# Patient Record
Sex: FEMALE | Race: WHITE | Employment: FULL TIME
[De-identification: names, ages, dates, MRNs, and addresses within clinical notes are randomized per-mention and may not be internally consistent; named-entity substitution may affect disease eponyms.]

---
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
## 2019-02-12 ENCOUNTER — APPOINTMENT (OUTPATIENT)
Dept: ULTRASOUND IMAGING | Age: 19
End: 2019-02-12
Payer: COMMERCIAL

## 2019-02-12 ENCOUNTER — HOSPITAL ENCOUNTER (EMERGENCY)
Age: 19
Discharge: HOME | End: 2019-02-12
Attending: EMERGENCY MEDICINE
Payer: COMMERCIAL

## 2019-02-12 VITALS
HEIGHT: 63 IN | TEMPERATURE: 97.9 F | OXYGEN SATURATION: 98 % | DIASTOLIC BLOOD PRESSURE: 72 MMHG | RESPIRATION RATE: 16 BRPM | WEIGHT: 152 LBS | HEART RATE: 70 BPM | BODY MASS INDEX: 26.93 KG/M2 | SYSTOLIC BLOOD PRESSURE: 111 MMHG

## 2019-02-12 DIAGNOSIS — R11.2 NON-INTRACTABLE VOMITING WITH NAUSEA, UNSPECIFIED VOMITING TYPE: ICD-10-CM

## 2019-02-12 DIAGNOSIS — N12 PYELONEPHRITIS: Primary | ICD-10-CM

## 2019-02-12 LAB
-: ABNORMAL
ALBUMIN SERPL-MCNC: 4.4 G/DL (ref 3.5–5.2)
ALBUMIN/GLOBULIN RATIO: NORMAL (ref 1–2.5)
ALP BLD-CCNC: 62 U/L (ref 35–104)
ALT SERPL-CCNC: 8 U/L (ref 5–33)
AMORPHOUS: ABNORMAL
ANION GAP SERPL CALCULATED.3IONS-SCNC: 9 MMOL/L (ref 9–17)
AST SERPL-CCNC: 14 U/L
BACTERIA: ABNORMAL
BILIRUB SERPL-MCNC: 0.34 MG/DL (ref 0.3–1.2)
BILIRUBIN URINE: NEGATIVE
BUN BLDV-MCNC: 7 MG/DL (ref 6–20)
BUN/CREAT BLD: NORMAL (ref 9–20)
CALCIUM SERPL-MCNC: 9.3 MG/DL (ref 8.6–10.4)
CASTS UA: ABNORMAL /LPF
CHLORIDE BLD-SCNC: 104 MMOL/L (ref 98–107)
CO2: 28 MMOL/L (ref 20–31)
COLOR: YELLOW
COMMENT UA: ABNORMAL
CREAT SERPL-MCNC: 0.64 MG/DL (ref 0.5–0.9)
CRYSTALS, UA: ABNORMAL /HPF
EPITHELIAL CELLS UA: ABNORMAL /HPF
GFR AFRICAN AMERICAN: NORMAL ML/MIN
GFR NON-AFRICAN AMERICAN: NORMAL ML/MIN
GFR SERPL CREATININE-BSD FRML MDRD: NORMAL ML/MIN/{1.73_M2}
GFR SERPL CREATININE-BSD FRML MDRD: NORMAL ML/MIN/{1.73_M2}
GLUCOSE BLD-MCNC: 80 MG/DL (ref 70–99)
GLUCOSE URINE: NEGATIVE
HCG(URINE) PREGNANCY TEST: NEGATIVE
HCT VFR BLD CALC: 37.7 % (ref 36–46)
HEMOGLOBIN: 12.9 G/DL (ref 12–16)
KETONES, URINE: NEGATIVE
LEUKOCYTE ESTERASE, URINE: ABNORMAL
LIPASE: 18 U/L (ref 13–60)
MCH RBC QN AUTO: 32.5 PG (ref 26–34)
MCHC RBC AUTO-ENTMCNC: 34.1 G/DL (ref 31–37)
MCV RBC AUTO: 95.3 FL (ref 80–100)
MUCUS: ABNORMAL
NITRITE, URINE: POSITIVE
NRBC AUTOMATED: ABNORMAL PER 100 WBC
OTHER OBSERVATIONS UA: ABNORMAL
PDW BLD-RTO: 13.2 % (ref 11.5–14.9)
PH UA: 7 (ref 5–8)
PLATELET # BLD: 225 K/UL (ref 150–450)
PMV BLD AUTO: 9.7 FL (ref 6–12)
POTASSIUM SERPL-SCNC: 3.9 MMOL/L (ref 3.7–5.3)
PROTEIN UA: ABNORMAL
RBC # BLD: 3.95 M/UL (ref 4–5.2)
RBC UA: ABNORMAL /HPF
RENAL EPITHELIAL, UA: ABNORMAL /HPF
SODIUM BLD-SCNC: 141 MMOL/L (ref 135–144)
SPECIFIC GRAVITY UA: 1.02 (ref 1–1.03)
TOTAL PROTEIN: 7.1 G/DL (ref 6.4–8.3)
TRICHOMONAS: ABNORMAL
TURBIDITY: ABNORMAL
URINE HGB: NEGATIVE
UROBILINOGEN, URINE: NORMAL
WBC # BLD: 4.6 K/UL (ref 4.5–13.5)
WBC UA: ABNORMAL /HPF
YEAST: ABNORMAL

## 2019-02-12 PROCEDURE — 87086 URINE CULTURE/COLONY COUNT: CPT

## 2019-02-12 PROCEDURE — 96375 TX/PRO/DX INJ NEW DRUG ADDON: CPT

## 2019-02-12 PROCEDURE — 6360000002 HC RX W HCPCS: Performed by: EMERGENCY MEDICINE

## 2019-02-12 PROCEDURE — 99284 EMERGENCY DEPT VISIT MOD MDM: CPT

## 2019-02-12 PROCEDURE — 80053 COMPREHEN METABOLIC PANEL: CPT

## 2019-02-12 PROCEDURE — 36415 COLL VENOUS BLD VENIPUNCTURE: CPT

## 2019-02-12 PROCEDURE — 96365 THER/PROPH/DIAG IV INF INIT: CPT

## 2019-02-12 PROCEDURE — 85027 COMPLETE CBC AUTOMATED: CPT

## 2019-02-12 PROCEDURE — 84703 CHORIONIC GONADOTROPIN ASSAY: CPT

## 2019-02-12 PROCEDURE — 76705 ECHO EXAM OF ABDOMEN: CPT

## 2019-02-12 PROCEDURE — 87186 SC STD MICRODIL/AGAR DIL: CPT

## 2019-02-12 PROCEDURE — 2580000003 HC RX 258: Performed by: EMERGENCY MEDICINE

## 2019-02-12 PROCEDURE — 81001 URINALYSIS AUTO W/SCOPE: CPT

## 2019-02-12 PROCEDURE — 87088 URINE BACTERIA CULTURE: CPT

## 2019-02-12 PROCEDURE — 83690 ASSAY OF LIPASE: CPT

## 2019-02-12 RX ORDER — ONDANSETRON 2 MG/ML
4 INJECTION INTRAMUSCULAR; INTRAVENOUS ONCE
Status: COMPLETED | OUTPATIENT
Start: 2019-02-12 | End: 2019-02-12

## 2019-02-12 RX ORDER — 0.9 % SODIUM CHLORIDE 0.9 %
1000 INTRAVENOUS SOLUTION INTRAVENOUS ONCE
Status: COMPLETED | OUTPATIENT
Start: 2019-02-12 | End: 2019-02-12

## 2019-02-12 RX ORDER — CEPHALEXIN 500 MG/1
500 CAPSULE ORAL 4 TIMES DAILY
Qty: 40 CAPSULE | Refills: 0 | Status: SHIPPED | OUTPATIENT
Start: 2019-02-12 | End: 2019-02-22

## 2019-02-12 RX ORDER — KETOROLAC TROMETHAMINE 30 MG/ML
15 INJECTION, SOLUTION INTRAMUSCULAR; INTRAVENOUS ONCE
Status: COMPLETED | OUTPATIENT
Start: 2019-02-12 | End: 2019-02-12

## 2019-02-12 RX ADMIN — CEFTRIAXONE SODIUM 1 G: 1 INJECTION, POWDER, FOR SOLUTION INTRAMUSCULAR; INTRAVENOUS at 13:11

## 2019-02-12 RX ADMIN — ONDANSETRON 4 MG: 2 INJECTION INTRAMUSCULAR; INTRAVENOUS at 12:07

## 2019-02-12 RX ADMIN — KETOROLAC TROMETHAMINE 15 MG: 30 INJECTION, SOLUTION INTRAMUSCULAR; INTRAVENOUS at 12:07

## 2019-02-12 RX ADMIN — SODIUM CHLORIDE 1000 ML: 9 INJECTION, SOLUTION INTRAVENOUS at 12:07

## 2019-02-12 ASSESSMENT — ENCOUNTER SYMPTOMS
ABDOMINAL PAIN: 1
VOMITING: 1
SHORTNESS OF BREATH: 0
DIARRHEA: 0
BACK PAIN: 0
NAUSEA: 1

## 2019-02-12 ASSESSMENT — PAIN SCALES - GENERAL
PAINLEVEL_OUTOF10: 8
PAINLEVEL_OUTOF10: 8

## 2019-02-12 ASSESSMENT — PAIN DESCRIPTION - PAIN TYPE: TYPE: ACUTE PAIN

## 2019-02-12 ASSESSMENT — PAIN DESCRIPTION - DESCRIPTORS: DESCRIPTORS: POUNDING;CONSTANT

## 2019-02-12 ASSESSMENT — PAIN DESCRIPTION - LOCATION: LOCATION: HEAD

## 2019-02-13 LAB
CULTURE: ABNORMAL
Lab: ABNORMAL
SPECIMEN DESCRIPTION: ABNORMAL

## 2019-05-16 ENCOUNTER — HOSPITAL ENCOUNTER (EMERGENCY)
Age: 19
Discharge: HOME | End: 2019-05-16
Attending: EMERGENCY MEDICINE
Payer: COMMERCIAL

## 2019-05-16 VITALS
SYSTOLIC BLOOD PRESSURE: 114 MMHG | HEART RATE: 64 BPM | RESPIRATION RATE: 14 BRPM | DIASTOLIC BLOOD PRESSURE: 55 MMHG | WEIGHT: 150 LBS | BODY MASS INDEX: 26.58 KG/M2 | HEIGHT: 63 IN | TEMPERATURE: 98.8 F | OXYGEN SATURATION: 98 %

## 2019-05-16 DIAGNOSIS — N30.00 ACUTE CYSTITIS WITHOUT HEMATURIA: Primary | ICD-10-CM

## 2019-05-16 LAB
-: ABNORMAL
AMORPHOUS: ABNORMAL
BACTERIA: ABNORMAL
BILIRUBIN URINE: NEGATIVE
CASTS UA: ABNORMAL /LPF
COLOR: YELLOW
COMMENT UA: ABNORMAL
CRYSTALS, UA: ABNORMAL /HPF
EPITHELIAL CELLS UA: ABNORMAL /HPF
GLUCOSE URINE: NEGATIVE
HCG(URINE) PREGNANCY TEST: NEGATIVE
KETONES, URINE: NEGATIVE
LEUKOCYTE ESTERASE, URINE: ABNORMAL
MUCUS: ABNORMAL
NITRITE, URINE: POSITIVE
OTHER OBSERVATIONS UA: ABNORMAL
PH UA: 6.5 (ref 5–8)
PROTEIN UA: NEGATIVE
RBC UA: ABNORMAL /HPF
RENAL EPITHELIAL, UA: ABNORMAL /HPF
SPECIFIC GRAVITY UA: 1.02 (ref 1–1.03)
TRICHOMONAS: ABNORMAL
TURBIDITY: ABNORMAL
URINE HGB: NEGATIVE
UROBILINOGEN, URINE: NORMAL
WBC UA: ABNORMAL /HPF
YEAST: ABNORMAL

## 2019-05-16 PROCEDURE — 84703 CHORIONIC GONADOTROPIN ASSAY: CPT

## 2019-05-16 PROCEDURE — 87086 URINE CULTURE/COLONY COUNT: CPT

## 2019-05-16 PROCEDURE — 87186 SC STD MICRODIL/AGAR DIL: CPT

## 2019-05-16 PROCEDURE — 99284 EMERGENCY DEPT VISIT MOD MDM: CPT

## 2019-05-16 PROCEDURE — 81001 URINALYSIS AUTO W/SCOPE: CPT

## 2019-05-16 PROCEDURE — 87088 URINE BACTERIA CULTURE: CPT

## 2019-05-16 RX ORDER — NITROFURANTOIN 25; 75 MG/1; MG/1
100 CAPSULE ORAL 2 TIMES DAILY
Qty: 10 CAPSULE | Refills: 0 | Status: SHIPPED | OUTPATIENT
Start: 2019-05-16 | End: 2019-05-21

## 2019-05-16 ASSESSMENT — PAIN DESCRIPTION - ORIENTATION: ORIENTATION: RIGHT;LOWER

## 2019-05-16 ASSESSMENT — PAIN DESCRIPTION - PAIN TYPE: TYPE: ACUTE PAIN

## 2019-05-16 ASSESSMENT — PAIN DESCRIPTION - LOCATION: LOCATION: BACK;FLANK

## 2019-05-16 ASSESSMENT — PAIN DESCRIPTION - DESCRIPTORS: DESCRIPTORS: RADIATING

## 2019-05-16 ASSESSMENT — PAIN SCALES - GENERAL: PAINLEVEL_OUTOF10: 6

## 2019-05-16 ASSESSMENT — PAIN DESCRIPTION - FREQUENCY: FREQUENCY: INTERMITTENT

## 2019-05-16 NOTE — ED PROVIDER NOTES
[address]  Emergency Department  Faculty Attestation     Pt Name: Lilian Iraheta  MRN: [id number]  [place] 2000  Date of evaluation: 5/16/19    I was personally available for consultation by the MLP in the Emergency Department for chart review, as well as discussion about the assessment, treatment plan and disposition. Lilian Iraheta is a 22 y.o. female who presents with Back Pain (right, past couple days) and Flank Pain (right, radiates around to right lower abd)      Vitals:   Vitals:    05/16/19 1024 05/16/19 1221 05/16/19 1222   BP: 126/76 (!) 114/55    Pulse: 85  64   Resp: 14 14    Temp: 98.2 °F (36.8 °C) 98.8 °F (37.1 °C)    TempSrc: Oral Oral    SpO2: 98% 98%    Weight: 150 lb (68 kg)     Height: 5' 3\" (1.6 m)         Impression:   1.  Acute cystitis without hematuria        Duane Ryder MD  Attending Emergency  Physician    (Please note that portions of this note were completed with a voice recognition program.  Efforts were made to edit the dictations but occasionally words are mis-transcribed.)        Matt Joseph MD  05/16/19 1227

## 2019-05-16 NOTE — ED PROVIDER NOTES
[address]  eMERGENCY dEPARTMENT eNCOUnter      Pt Name: Stacia Hernandez  MRN: [id number]  [place] 2000  Date of evaluation: 5/16/2019  Provider: Rita Petit PA-C    CHIEF COMPLAINT       Chief Complaint   Patient presents with    Back Pain     right, past couple days    Flank Pain     right, radiates around to right lower abd           HISTORY OF PRESENT ILLNESS  (Location/Symptom, Timing/Onset, Context/Setting, Quality, Duration, Modifying Factors, Severity.)   Stacia Hernandez is a 22 y.o. female who presents to the emergency department  With complaints of right flank pain. Pain began several days ago. Pt reports the pain is 6/10, intermittent with radiation across lower back to left side. Pt denies radiation to groin, fever, chills, cp, sob, nausea, vomiting, abd pain, dysuria, urgency, frequency, hematuria, vaginal discharge, vaginal bleeding. Pt has never had a kidney stone. No other complaints. Nursing Notes were reviewed. REVIEW OF SYSTEMS    (2-9 systems for level 4, 10 or more for level 5)     Review of Systems   Flank pain     Except as noted above the remainder of the review of systems was reviewed and negative. PAST MEDICAL HISTORY     Past Medical History:   Diagnosis Date    Ankle fracture 11/2/2012 08/2005     Contact dermatitis 11/2/2012    Obesity 11/2/2012     None otherwise stated in nurses notes    SURGICAL HISTORY     History reviewed. No pertinent surgical history. None otherwise stated in nurses notes    CURRENT MEDICATIONS       Previous Medications    OMEPRAZOLE (PRILOSEC) 20 MG DELAYED RELEASE CAPSULE    Take 1 capsule by mouth 2 times daily (before meals)    SERTRALINE (ZOLOFT) 50 MG TABLET    Take 1 tablet by mouth daily       ALLERGIES     Patient has no known allergies.     FAMILY HISTORY           Problem Relation Age of Onset    Arthritis Father     Mental Illness Father     Asthma Sister     High Blood Pressure Sister     Early Death Brother     Diabetes Maternal Grandmother     Hearing Loss Maternal Grandmother     Stroke Maternal Grandmother     Hearing Loss Maternal Grandfather     Other Mother         spinal deformity    Heart Attack Mother     High Blood Pressure Mother     Hearing Loss Other      Family Status   Relation Name Status    Father maryjane Alive    Sister alban Alive    Brother oleg     MGFAITH wick     MGF wilian Alive    CECILLE banks     mEanuel Markel Alive    Mother Sienna Alive    Other  (Not Specified)      None otherwise stated in nurses notes    SOCIAL HISTORY      reports that she is a non-smoker but has been exposed to tobacco smoke. She has never used smokeless tobacco. She reports that she has current or past drug history. Drug: Marijuana. She reports that she does not drink alcohol. lives at home with others     PHYSICAL EXAM    (up to 7 for level 4, 8 or more for level 5)     ED Triage Vitals [19 1024]   BP Temp Temp Source Heart Rate Resp SpO2 Height Weight   126/76 98.2 °F (36.8 °C) Oral 85 14 98 % 5' 3\" (1.6 m) 150 lb (68 kg)       Physical Exam   Nursing note and vitals reviewed. Constitutional: Oriented to person, place, and time and well-developed, well-nourished. Head: Normocephalic and atraumatic. Ear: External ears normal.   Nose: Nose normal and midline. Eyes: Conjunctivae and EOM are normal. Pupils are equal, round, and reactive to light. Neck: Normal range of motion. Neck supple. Throat: Posterior pharynx is without erythema or exudates, airway is patent, no swelling  Cardiovascular: Normal rate, regular rhythm, normal heart sounds and intact distal pulses. Pulmonary/Chest: Effort normal and breath sounds normal. No respiratory distress. No wheezes. No rales. No chest tenderness. Abdominal: Soft. Bowel sounds are normal. No distension and no mass. There is no tenderness. There is no rebound and no guarding. mild right CVA tenderness. Musculoskeletal: Normal range of motion. Neurological: Alert and oriented to person, place, and time. GCS score is 15. Skin: Skin is warm and dry. No rash noted. No erythema. No pallor. Psychiatric: Mood, memory, affect and judgment normal.           DIAGNOSTIC RESULTS     EKG: All EKG's are interpreted by the Emergency Department Physician who either signs or Co-signs this chart in the absence of a cardiologist.        RADIOLOGY:   All plain film, CT, MRI, and formal ultrasound images (except ED bedside ultrasound) are read by the radiologist, see reports below, unless otherwise noted in MDM or here. No orders to display       No results found. LABS:  Labs Reviewed   URINE RT REFLEX TO CULTURE - Abnormal; Notable for the following components:       Result Value    Turbidity UA TURBID (*)     Nitrite, Urine POSITIVE (*)     Leukocyte Esterase, Urine MOD (*)     All other components within normal limits   MICROSCOPIC URINALYSIS - Abnormal; Notable for the following components:    Bacteria, UA MANY (*)     All other components within normal limits   URINE CULTURE CLEAN CATCH   PREGNANCY, URINE       All other labs were within normal range or not returned as of this dictation.     EMERGENCY DEPARTMENT COURSE and DIFFERENTIAL DIAGNOSIS/MDM:   Vitals:    Vitals:    05/16/19 1024 05/16/19 1221 05/16/19 1222   BP: 126/76 (!) 114/55    Pulse: 85  64   Resp: 14 14    Temp: 98.2 °F (36.8 °C) 98.8 °F (37.1 °C)    TempSrc: Oral Oral    SpO2: 98% 98%    Weight: 150 lb (68 kg)     Height: 5' 3\" (1.6 m)           Patient instructed to return to the emergency room if symptoms worsen, return, or any other concern right away which is agreed by the patient    ED MEDS:  Orders Placed This Encounter   Medications    nitrofurantoin, macrocrystal-monohydrate, (MACROBID) 100 MG capsule     Sig: Take 1 capsule by mouth 2 times daily for 5 days     Dispense:  10 capsule     Refill:  0 CONSULTS:  None    PROCEDURES:  None      FINAL IMPRESSION      1. Acute cystitis without hematuria          DISPOSITION/PLAN   DISPOSITION Decision To Discharge    PATIENT REFERRED TO:  ANI Ward CNP  [address] [facility]  [address] [facility] [address]  [phone]    Call       [address]  [address]  [phone]  Call         DISCHARGE MEDICATIONS:  New Prescriptions    NITROFURANTOIN, MACROCRYSTAL-MONOHYDRATE, (MACROBID) 100 MG CAPSULE    Take 1 capsule by mouth 2 times daily for 5 days         Summation      Patient Course:     Mild right flank pain. Intermittent past few days. No urinary symptoms. Pain radiations across lower back. No pain to groin. (nurse note incorrect). UA shows positive nitrites, moderate leukocytes. No blood. Suspect UTI. Low suspicion for kidney stone, pyelo. Will dc home with macrobid. Strict return instructions discussed. Follow up with PCP. Discussed results and plan with the pt. They expressed appropriate understanding. Pt given close follow up, supportive care instructions and strict return instructions at the bedside. ED Medications administered this visit:  Medications - No data to display    New Prescriptions from this visit:    New Prescriptions    NITROFURANTOIN, MACROCRYSTAL-MONOHYDRATE, (MACROBID) 100 MG CAPSULE    Take 1 capsule by mouth 2 times daily for 5 days       Follow-up:  ANI Ward CNP  [address] [facility]  [address] [facility],[address]  [address] [facility] [address]  [phone]    Call       [address] [facility]  [place] [address] [phone]  Call           Final Impression:   1.  Acute cystitis without hematuria               (Please note that portions of this note were completed with a voice recognition program.  Efforts were made to edit the dictations but occasionally words are mis-transcribed.)      (Please note that portions of this note were completed with a voice recognition

## 2019-05-17 LAB
CULTURE: ABNORMAL
Lab: ABNORMAL
SPECIMEN DESCRIPTION: ABNORMAL

## 2019-05-28 ENCOUNTER — OFFICE VISIT (OUTPATIENT)
Dept: UROLOGY | Age: 19
End: 2019-05-28
Payer: COMMERCIAL

## 2019-05-28 VITALS
HEIGHT: 63 IN | WEIGHT: 153 LBS | TEMPERATURE: 99 F | BODY MASS INDEX: 27.11 KG/M2 | SYSTOLIC BLOOD PRESSURE: 98 MMHG | DIASTOLIC BLOOD PRESSURE: 54 MMHG | HEART RATE: 65 BPM

## 2019-05-28 DIAGNOSIS — N39.0 RECURRENT UTI: Primary | ICD-10-CM

## 2019-05-28 PROCEDURE — G8419 CALC BMI OUT NRM PARAM NOF/U: HCPCS | Performed by: UROLOGY

## 2019-05-28 PROCEDURE — 99204 OFFICE O/P NEW MOD 45 MIN: CPT | Performed by: UROLOGY

## 2019-05-28 PROCEDURE — G8427 DOCREV CUR MEDS BY ELIG CLIN: HCPCS | Performed by: UROLOGY

## 2019-05-28 PROCEDURE — 1036F TOBACCO NON-USER: CPT | Performed by: UROLOGY

## 2019-05-28 RX ORDER — NITROFURANTOIN MACROCRYSTALS 100 MG/1
100 CAPSULE ORAL DAILY
Qty: 30 CAPSULE | Refills: 5 | Status: SHIPPED | OUTPATIENT
Start: 2019-05-28 | End: 2019-06-27

## 2019-05-28 ASSESSMENT — ENCOUNTER SYMPTOMS
BACK PAIN: 1
EYE REDNESS: 0
NAUSEA: 0
ABDOMINAL PAIN: 0
WHEEZING: 0
COLOR CHANGE: 0
SHORTNESS OF BREATH: 0
COUGH: 0
EYE PAIN: 0
VOMITING: 0

## 2019-05-28 NOTE — PROGRESS NOTES
Review of Systems   Constitutional: Negative for appetite change, chills and fever. Eyes: Negative for pain, redness and visual disturbance. Respiratory: Negative for cough, shortness of breath and wheezing. Cardiovascular: Negative for chest pain and leg swelling. Gastrointestinal: Negative for abdominal pain, nausea and vomiting. Genitourinary: Positive for difficulty urinating (at times ) and flank pain. Negative for dysuria, frequency, hematuria and vaginal discharge. Musculoskeletal: Positive for back pain. Negative for joint swelling and myalgias. Skin: Negative for color change, rash and wound. Neurological: Negative for dizziness, tremors and numbness. Hematological: Negative for adenopathy. Does not bruise/bleed easily.
Pulse: 65   Temp: 99 °F (37.2 °C)     Body mass index is 27.1 kg/m². Physical Exam  Constitutional: Patient in no acute distress, ggod grooming, appropriately dressed  Neuro: Alert and oriented to person, place and time. Psych:Mood normal, affect normal  Lungs: Respiratory effort is normal  Cardiovascular: strong and regular, no lower leg edema  Abdomen: Soft, non-tender, non-distended with no CVA,    Lymphatics: No cervical palpable lymphadenopathy. Bladder non-tender and not distended. Musculoskeletal: Normal gait and station        Assessment and Plan      1. Recurrent UTI            Plan:         She has had recurrent UTIs for the last 2 years. No fevers. Will obtain upper tract imaging. Prescriptions Ordered:  No orders of the defined types were placed in this encounter. Orders Placed:  No orders of the defined types were placed in this encounter. Ramonita Marcelo MD    Agree with the ROS entered by the MA.

## 2019-05-30 ENCOUNTER — HOSPITAL ENCOUNTER (OUTPATIENT)
Dept: ULTRASOUND IMAGING | Age: 19
Discharge: HOME | End: 2019-06-01
Payer: COMMERCIAL

## 2019-05-30 DIAGNOSIS — N39.0 RECURRENT UTI: ICD-10-CM

## 2019-05-30 PROCEDURE — 76770 US EXAM ABDO BACK WALL COMP: CPT

## 2019-06-19 ENCOUNTER — APPOINTMENT (OUTPATIENT)
Dept: GENERAL RADIOLOGY | Age: 19
End: 2019-06-19
Payer: COMMERCIAL

## 2019-06-19 ENCOUNTER — HOSPITAL ENCOUNTER (EMERGENCY)
Age: 19
Discharge: HOME | End: 2019-06-19
Attending: EMERGENCY MEDICINE
Payer: COMMERCIAL

## 2019-06-19 VITALS
RESPIRATION RATE: 16 BRPM | BODY MASS INDEX: 25.61 KG/M2 | HEART RATE: 78 BPM | WEIGHT: 150 LBS | DIASTOLIC BLOOD PRESSURE: 78 MMHG | OXYGEN SATURATION: 98 % | TEMPERATURE: 98.8 F | HEIGHT: 64 IN | SYSTOLIC BLOOD PRESSURE: 129 MMHG

## 2019-06-19 DIAGNOSIS — S60.00XA CONTUSION OF FINGER WITHOUT DAMAGE TO NAIL, UNSPECIFIED FINGER, INITIAL ENCOUNTER: Primary | ICD-10-CM

## 2019-06-19 PROCEDURE — 73140 X-RAY EXAM OF FINGER(S): CPT

## 2019-06-19 PROCEDURE — 99283 EMERGENCY DEPT VISIT LOW MDM: CPT

## 2019-06-19 ASSESSMENT — PAIN SCALES - GENERAL: PAINLEVEL_OUTOF10: 5

## 2019-06-19 NOTE — ED PROVIDER NOTES
[address]  eMERGENCY dEPARTMENT eNCOUnter      Pt Name: Torrey Figueroa  MRN: [id number]  [place] 2000  Date of evaluation: 6/19/19      CHIEF COMPLAINT       Chief Complaint   Patient presents with    Finger Injury         HISTORY OF PRESENT ILLNESS    Torrey Figueroa is a 22 y.o. female who presents complaining of left index finger pain    Hand Problem   Location:  Finger  Finger location:  L index finger  Injury: yes    Time since incident:  1 hour  Mechanism of injury comment:  Direct blow to left index finger while working on her truck  Pain details:     Quality:  Aching    Radiates to:  Does not radiate    Severity:  Mild    Onset quality:  Sudden    Duration:  1 hour    Timing:  Intermittent    Progression:  Waxing and waning  Handedness:  Left-handed  Dislocation: no    Foreign body present:  No foreign bodies  Tetanus status:  Up to date  Relieved by:  Rest  Worsened by:  Stretching area  Ineffective treatments:  None tried  Associated symptoms: swelling    Associated symptoms comment:  Small abrasion to the left index finger at the PIP joint no deformity brisk capillary refills distally states she is working on her truck and she had her hand causing an injury to her left index finger      REVIEW OF SYSTEMS       Review of Systems   Musculoskeletal: Positive for arthralgias (left index finger pain and abrasion). All other systems reviewed and are negative. PAST MEDICAL HISTORY     Past Medical History:   Diagnosis Date    Ankle fracture 11/2/2012 08/2005     Contact dermatitis 11/2/2012    Obesity 11/2/2012       SURGICAL HISTORY     History reviewed. No pertinent surgical history.     CURRENT MEDICATIONS       Discharge Medication List as of 6/19/2019  6:29 PM      CONTINUE these medications which have NOT CHANGED    Details   omeprazole (PRILOSEC) 20 MG delayed release capsule Take 1 capsule by mouth 2 times daily (before meals), Disp-60 capsule, R-5Normal      sertraline (ZOLOFT) 50 MG tablet Take 1 tablet by mouth daily, Disp-90 tablet, R-1Normal      nitrofurantoin (MACRODANTIN) 100 MG capsule Take 1 capsule by mouth daily, Disp-30 capsule, R-5Normal             ALLERGIES     has No Known Allergies. FAMILY HISTORY     indicated that her mother is alive. She indicated that her father is alive. She indicated that her sister is alive. She indicated that her brother is . She indicated that her maternal grandmother is . She indicated that her maternal grandfather is alive. She indicated that her paternal grandfather is . She indicated that her maternal aunt is alive. She indicated that the status of her other is unknown. SOCIAL HISTORY      reports that she is a non-smoker but has been exposed to tobacco smoke. She has never used smokeless tobacco. She reports that she has current or past drug history. Drug: Marijuana. She reports that she does not drink alcohol. PHYSICAL EXAM     INITIAL VITALS: /78   Pulse 78   Temp 98.8 °F (37.1 °C) (Oral)   Resp 16   Ht 5' 3.5\" (1.613 m)   Wt 150 lb (68 kg)   LMP 2019 (Exact Date)   SpO2 98%   BMI 26.15 kg/m²      Physical Exam   Constitutional: She is oriented to person, place, and time. She appears well-developed and well-nourished. HENT:   Head: Normocephalic and atraumatic. Cardiovascular: Normal rate, regular rhythm and normal heart sounds. Pulmonary/Chest: Effort normal and breath sounds normal.   Musculoskeletal: She exhibits tenderness (dorsal aspect of left index finger). She exhibits no edema or deformity. Neurological: She is alert and oriented to person, place, and time. Skin: Capillary refill takes less than 2 seconds. Nursing note and vitals reviewed. MEDICAL DECISION MAKING:       Wound cleanse bulky dressing splint applied. Ice elevate Tylenol Motrin for pain return for any worsening of symptoms. Follow-up with your doctor for recheck  DIAGNOSTIC RESULTS     EKG:  All EKG's are interpreted by the Emergency Department Physician who either signs or Co-signs this chart in the absence of acardiologist.        RADIOLOGY:Allplain film, CT, MRI, and formal ultrasound images (except ED bedside ultrasound) are read by the radiologist and the images and interpretations are directly viewed by the emergency physician. LABS:All lab results were reviewed by myself, and all abnormals are listed below. Labs Reviewed - No data to display      EMERGENCY DEPARTMENT COURSE:   Vitals:    Vitals:    06/19/19 1726 06/19/19 1821   BP: 129/78    Pulse: 78    Resp: 16    Temp: 98.8 °F (37.1 °C)    TempSrc: Oral    SpO2:  98%   Weight: 150 lb (68 kg)    Height: 5' 3.5\" (1.613 m)        The patient was given the following medications while in the emergency department:  No orders of the defined types were placed in this encounter. -------------------------      CRITICAL CARE:    CONSULTS:  None    PROCEDURES:  Procedures    FINAL IMPRESSION      1.  Contusion of finger without damage to nail, unspecified finger, initial encounter          DISPOSITION/PLAN   DISPOSITION        PATIENT REFERREDTO:  Braulio Frankel, APRN - [facility] [place]  [address]  [address] [place] [address],[address]  [address] [place] [address]  [phone]    Schedule an appointment as soon as possible for a visit   As needed    [facility]  [facility] [address] [phone]    If symptoms worsen      DISCHARGEMEDICATIONS:  Discharge Medication List as of 6/19/2019  6:29 PM          (Please note that portions of this note were completed with a voice recognition program.  Efforts were made to edit thedictations but occasionally words are mis-transcribed.)    ENRIQUE Dover PA-C  06/19/19 2827

## 2019-06-19 NOTE — ED NOTES
Pt given instructions for follow-up and discharge. Pt verbalizes understanding. Pt is A&O x4, PWD, eupneic, and ambulatory with steady, even gait upon discharge.       Rachel Olmstead RN  06/19/19 9765

## 2019-06-20 NOTE — ED PROVIDER NOTES
[address]  eMERGENCY dEPARTMENT eNCOUnter   Independent Attestation     Pt Name: Deepti Abreu  MRN: [id number]  [place] 2000  Date of evaluation: 6/19/19   Deepti Abreu is a 22 y.o. female who presents with Finger Injury    Vitals:   Vitals:    06/19/19 1726 06/19/19 1821   BP: 129/78    Pulse: 78    Resp: 16    Temp: 98.8 °F (37.1 °C)    TempSrc: Oral    SpO2:  98%   Weight: 150 lb (68 kg)    Height: 5' 3.5\" (1.613 m)      Impression:   1. Contusion of finger without damage to nail, unspecified finger, initial encounter      I was personally available for consultation in the Emergency Department. I have reviewed the chart and agree with the documentation as recorded by the [facility], including the assessment, treatment plan and disposition.   Oscar Olivas MD  Attending Emergency  Physician                  Oscar Olivas MD  06/19/19 8354

## 2019-07-09 ENCOUNTER — OFFICE VISIT (OUTPATIENT)
Dept: UROLOGY | Age: 19
End: 2019-07-09
Payer: COMMERCIAL

## 2019-07-09 VITALS
BODY MASS INDEX: 25.59 KG/M2 | HEIGHT: 64 IN | WEIGHT: 149.91 LBS | HEART RATE: 59 BPM | DIASTOLIC BLOOD PRESSURE: 68 MMHG | SYSTOLIC BLOOD PRESSURE: 113 MMHG | TEMPERATURE: 98.6 F

## 2019-07-09 DIAGNOSIS — N39.0 RECURRENT UTI: Primary | ICD-10-CM

## 2019-07-09 PROCEDURE — G8427 DOCREV CUR MEDS BY ELIG CLIN: HCPCS | Performed by: UROLOGY

## 2019-07-09 PROCEDURE — 1036F TOBACCO NON-USER: CPT | Performed by: UROLOGY

## 2019-07-09 PROCEDURE — G8419 CALC BMI OUT NRM PARAM NOF/U: HCPCS | Performed by: UROLOGY

## 2019-07-09 PROCEDURE — 99213 OFFICE O/P EST LOW 20 MIN: CPT | Performed by: UROLOGY

## 2019-07-09 ASSESSMENT — ENCOUNTER SYMPTOMS
COUGH: 0
VOMITING: 0
BACK PAIN: 0
EYE REDNESS: 0
ABDOMINAL PAIN: 0
COLOR CHANGE: 0
EYE PAIN: 0
SHORTNESS OF BREATH: 0
WHEEZING: 0
NAUSEA: 0

## 2019-07-09 NOTE — PROGRESS NOTES
[facility]  [facility] [place]  [address]  [address]  [address] [facility] [address]  Dept: [address] [facility] Urology Office Note - Established    Patient:  Renay Ji  YOB: 2000  Date: 7/9/2019    The patient is a 22 y.o. female whopresents today for evaluation of the following problems:   Chief Complaint   Patient presents with    Urinary Tract Infection     6 wk f/u with renal US; pt currently denies urinary s/s       HPI  She is here in follow up for recurrent UTIs. She has been doing well for the last 6 weeks. No recent hematuria or dysuria. She has not had any symptoms of an infection. She had an ultrasound, which was negative. When she thinks she has an infection, her urine is dark and she feels nauseated. Summary of old records: N/A    Additional History: N/A    Procedures Today: N/A    Urinalysis today:  No results found for this visit on 07/09/19. Imaging Reviewed during this Office Visit: renal ultrasound is negative. (results were independently reviewed by physician and radiology report verified)    AUA Symptom Score (7/9/2019):                              Last BUN and creatinine:  Lab Results   Component Value Date    BUN 7 02/12/2019     Lab Results   Component Value Date    CREATININE 0.64 02/12/2019       Additional Lab/Culture results: Urine cultures reviewed were E.Coli. PAST MEDICAL, FAMILY AND SOCIAL HISTORY UPDATE:  Past Medical History:   Diagnosis Date    Ankle fracture 11/2/2012 08/2005     Contact dermatitis 11/2/2012    Obesity 11/2/2012     History reviewed. No pertinent surgical history.   Family History   Problem Relation Age of Onset    Arthritis Father     Mental Illness Father     Asthma Sister     High Blood Pressure Sister     Early Death Brother     Diabetes Maternal Grandmother     Hearing Loss Maternal Grandmother     Stroke Maternal Grandmother     Hearing Loss Maternal Grandfather     Other Mother spinal deformity    Heart Attack Mother     High Blood Pressure Mother     Hearing Loss Other      Outpatient Medications Marked as Taking for the 7/9/19 encounter (Office Visit) with Kenny Strange MD   Medication Sig Dispense Refill    omeprazole (PRILOSEC) 20 MG delayed release capsule Take 1 capsule by mouth 2 times daily (before meals) 60 capsule 5    sertraline (ZOLOFT) 50 MG tablet Take 1 tablet by mouth daily 90 tablet 1      (All medications reviewed and updated by provider sincelast office visit or hospitalization)   Patient has no known allergies. Social History     Tobacco Use   Smoking Status Passive Smoke Exposure - Never Smoker   Smokeless Tobacco Never Used   Tobacco Comment    mom smokes outside      (If patient a smoker, smoking cessation counseling offered)     Social History     Substance and Sexual Activity   Alcohol Use No       REVIEW OF SYSTEMS:  Review of Systems      Physical Exam:      Vitals:    07/09/19 1113   BP: 113/68   Pulse: 59   Temp: 98.6 °F (37 °C)     Body mass index is 26.14 kg/m². Patient is a 22 y.o. female in noacute distress and alert and oriented to person, place and time. Physical Exam  Constitutional: Patient in no acute distress. Neuro: Alert andoriented to person, place and time. Psych: Mood normal, affect normal  Skin: No rash noted  Lungs: Respiratory effort is normal  Cardiovascular: Warm & Pink  Abdomen: Soft, non-tender, non-distended with no CVA,  No flank tenderness,  Or hepatosplenomegaly   Lymphatics: No palpable lymphadenopathy. Bladder non-tender and not distended. Musculoskeletal: Normalgait and station      Assessment and Plan      1. Recurrent UTI           Plan:         She has been doing well. Ultrasound was negative. Recommended D Mannose as she does not want any prescription meds. F/U in 6 months. Return in about 6 months (around 1/9/2020).     Prescriptions Ordered:  No orders of the defined types were placed in this encounter. Orders Placed:  No orders of the defined types were placed in this encounter. Matilde Rodas MD    Agree with the ROS entered by the MA.

## 2019-10-18 ENCOUNTER — CARE COORDINATION (OUTPATIENT)
Dept: CARE COORDINATION | Age: 19
End: 2019-10-18

## 2019-10-25 ENCOUNTER — CARE COORDINATION (OUTPATIENT)
Dept: CARE COORDINATION | Age: 19
End: 2019-10-25

## 2019-11-01 ENCOUNTER — CARE COORDINATION (OUTPATIENT)
Dept: CARE COORDINATION | Age: 19
End: 2019-11-01

## 2019-12-26 ENCOUNTER — HOSPITAL ENCOUNTER (EMERGENCY)
Age: 19
Discharge: HOME | End: 2019-12-26
Attending: EMERGENCY MEDICINE
Payer: COMMERCIAL

## 2019-12-26 VITALS
WEIGHT: 160 LBS | TEMPERATURE: 98.4 F | HEIGHT: 63 IN | SYSTOLIC BLOOD PRESSURE: 132 MMHG | OXYGEN SATURATION: 100 % | DIASTOLIC BLOOD PRESSURE: 76 MMHG | RESPIRATION RATE: 16 BRPM | BODY MASS INDEX: 28.35 KG/M2 | HEART RATE: 78 BPM

## 2019-12-26 DIAGNOSIS — N30.00 ACUTE CYSTITIS WITHOUT HEMATURIA: Primary | ICD-10-CM

## 2019-12-26 LAB
-: ABNORMAL
AMORPHOUS: ABNORMAL
BACTERIA: ABNORMAL
BILIRUBIN URINE: NEGATIVE
CASTS UA: ABNORMAL /LPF
COLOR: YELLOW
COMMENT UA: ABNORMAL
CRYSTALS, UA: ABNORMAL /HPF
EPITHELIAL CELLS UA: ABNORMAL /HPF
GLUCOSE URINE: NEGATIVE
HCG(URINE) PREGNANCY TEST: NEGATIVE
KETONES, URINE: NEGATIVE
LEUKOCYTE ESTERASE, URINE: ABNORMAL
MUCUS: ABNORMAL
NITRITE, URINE: NEGATIVE
OTHER OBSERVATIONS UA: ABNORMAL
PH UA: 7.5 (ref 5–8)
PROTEIN UA: ABNORMAL
RBC UA: ABNORMAL /HPF
RENAL EPITHELIAL, UA: ABNORMAL /HPF
SPECIFIC GRAVITY UA: 1 (ref 1–1.03)
TRICHOMONAS: ABNORMAL
TURBIDITY: ABNORMAL
URINE HGB: ABNORMAL
UROBILINOGEN, URINE: NORMAL
WBC UA: ABNORMAL /HPF
YEAST: ABNORMAL

## 2019-12-26 PROCEDURE — 87088 URINE BACTERIA CULTURE: CPT

## 2019-12-26 PROCEDURE — 87086 URINE CULTURE/COLONY COUNT: CPT

## 2019-12-26 PROCEDURE — 81001 URINALYSIS AUTO W/SCOPE: CPT

## 2019-12-26 PROCEDURE — 99283 EMERGENCY DEPT VISIT LOW MDM: CPT

## 2019-12-26 PROCEDURE — 87186 SC STD MICRODIL/AGAR DIL: CPT

## 2019-12-26 PROCEDURE — 81025 URINE PREGNANCY TEST: CPT

## 2019-12-26 PROCEDURE — 6370000000 HC RX 637 (ALT 250 FOR IP): Performed by: EMERGENCY MEDICINE

## 2019-12-26 RX ORDER — CEPHALEXIN 500 MG/1
500 CAPSULE ORAL 2 TIMES DAILY
Qty: 14 CAPSULE | Refills: 0 | Status: SHIPPED | OUTPATIENT
Start: 2019-12-26 | End: 2020-01-02

## 2019-12-26 RX ORDER — CEPHALEXIN 250 MG/1
500 CAPSULE ORAL ONCE
Status: COMPLETED | OUTPATIENT
Start: 2019-12-26 | End: 2019-12-26

## 2019-12-26 RX ADMIN — CEPHALEXIN 500 MG: 250 CAPSULE ORAL at 01:38

## 2019-12-26 ASSESSMENT — PAIN SCALES - GENERAL: PAINLEVEL_OUTOF10: 4

## 2019-12-26 ASSESSMENT — PAIN DESCRIPTION - FREQUENCY: FREQUENCY: CONTINUOUS

## 2019-12-26 ASSESSMENT — PAIN DESCRIPTION - DESCRIPTORS: DESCRIPTORS: PRESSURE

## 2019-12-27 LAB
CULTURE: ABNORMAL
Lab: ABNORMAL
SPECIMEN DESCRIPTION: ABNORMAL

## 2020-01-08 ENCOUNTER — TELEPHONE (OUTPATIENT)
Dept: UROLOGY | Age: 20
End: 2020-01-08

## 2020-01-08 NOTE — TELEPHONE ENCOUNTER
Spoke with patient that appointment on 1/14/2020 has been changed to 1/15/2020 at 4:30pm due to the provider not being in. Patient verbalized understanding of new appointment day and time.

## 2020-05-24 ENCOUNTER — HOSPITAL ENCOUNTER (EMERGENCY)
Age: 20
Discharge: HOME | End: 2020-05-24
Attending: EMERGENCY MEDICINE
Payer: COMMERCIAL

## 2020-05-24 VITALS
HEART RATE: 64 BPM | HEIGHT: 63 IN | WEIGHT: 190 LBS | SYSTOLIC BLOOD PRESSURE: 132 MMHG | DIASTOLIC BLOOD PRESSURE: 82 MMHG | TEMPERATURE: 97.8 F | RESPIRATION RATE: 15 BRPM | BODY MASS INDEX: 33.66 KG/M2 | OXYGEN SATURATION: 100 %

## 2020-05-24 LAB
-: ABNORMAL
AMORPHOUS: ABNORMAL
BACTERIA: ABNORMAL
BILIRUBIN URINE: NEGATIVE
CASTS UA: ABNORMAL /LPF
COLOR: YELLOW
COMMENT UA: ABNORMAL
CRYSTALS, UA: ABNORMAL /HPF
EPITHELIAL CELLS UA: ABNORMAL /HPF
GLUCOSE URINE: NEGATIVE
HCG(URINE) PREGNANCY TEST: NEGATIVE
KETONES, URINE: ABNORMAL
LEUKOCYTE ESTERASE, URINE: NEGATIVE
MUCUS: ABNORMAL
NITRITE, URINE: NEGATIVE
OTHER OBSERVATIONS UA: ABNORMAL
PH UA: 8 (ref 5–8)
PROTEIN UA: NEGATIVE
RBC UA: ABNORMAL /HPF
RENAL EPITHELIAL, UA: ABNORMAL /HPF
SPECIFIC GRAVITY UA: 1.02 (ref 1–1.03)
TRICHOMONAS: ABNORMAL
TURBIDITY: ABNORMAL
URINE HGB: NEGATIVE
UROBILINOGEN, URINE: NORMAL
WBC UA: ABNORMAL /HPF
YEAST: ABNORMAL

## 2020-05-24 PROCEDURE — 2580000003 HC RX 258: Performed by: EMERGENCY MEDICINE

## 2020-05-24 PROCEDURE — 6370000000 HC RX 637 (ALT 250 FOR IP): Performed by: PHYSICIAN ASSISTANT

## 2020-05-24 PROCEDURE — 6360000002 HC RX W HCPCS: Performed by: EMERGENCY MEDICINE

## 2020-05-24 PROCEDURE — 81001 URINALYSIS AUTO W/SCOPE: CPT

## 2020-05-24 PROCEDURE — 96374 THER/PROPH/DIAG INJ IV PUSH: CPT

## 2020-05-24 PROCEDURE — 81025 URINE PREGNANCY TEST: CPT

## 2020-05-24 PROCEDURE — 96375 TX/PRO/DX INJ NEW DRUG ADDON: CPT

## 2020-05-24 PROCEDURE — 99284 EMERGENCY DEPT VISIT MOD MDM: CPT

## 2020-05-24 RX ORDER — 0.9 % SODIUM CHLORIDE 0.9 %
2000 INTRAVENOUS SOLUTION INTRAVENOUS ONCE
Status: COMPLETED | OUTPATIENT
Start: 2020-05-24 | End: 2020-05-24

## 2020-05-24 RX ORDER — METOCLOPRAMIDE HYDROCHLORIDE 5 MG/ML
10 INJECTION INTRAMUSCULAR; INTRAVENOUS ONCE
Status: COMPLETED | OUTPATIENT
Start: 2020-05-24 | End: 2020-05-24

## 2020-05-24 RX ORDER — KETOROLAC TROMETHAMINE 30 MG/ML
30 INJECTION, SOLUTION INTRAMUSCULAR; INTRAVENOUS ONCE
Status: COMPLETED | OUTPATIENT
Start: 2020-05-24 | End: 2020-05-24

## 2020-05-24 RX ORDER — ONDANSETRON 4 MG/1
4 TABLET, ORALLY DISINTEGRATING ORAL ONCE
Status: COMPLETED | OUTPATIENT
Start: 2020-05-24 | End: 2020-05-24

## 2020-05-24 RX ADMIN — ONDANSETRON 4 MG: 4 TABLET, ORALLY DISINTEGRATING ORAL at 19:37

## 2020-05-24 RX ADMIN — KETOROLAC TROMETHAMINE 30 MG: 30 INJECTION, SOLUTION INTRAMUSCULAR at 20:43

## 2020-05-24 RX ADMIN — METOCLOPRAMIDE 10 MG: 5 INJECTION, SOLUTION INTRAMUSCULAR; INTRAVENOUS at 20:43

## 2020-05-24 RX ADMIN — SODIUM CHLORIDE 2000 ML: 9 INJECTION, SOLUTION INTRAVENOUS at 20:43

## 2020-05-24 ASSESSMENT — PAIN SCALES - GENERAL
PAINLEVEL_OUTOF10: 9
PAINLEVEL_OUTOF10: 8
PAINLEVEL_OUTOF10: 8

## 2020-05-24 ASSESSMENT — ENCOUNTER SYMPTOMS
SHORTNESS OF BREATH: 0
DIARRHEA: 0
ABDOMINAL PAIN: 0
COUGH: 0
NAUSEA: 1
VOMITING: 1
BACK PAIN: 0

## 2020-05-24 ASSESSMENT — PAIN DESCRIPTION - FREQUENCY: FREQUENCY: CONTINUOUS

## 2020-05-24 ASSESSMENT — PAIN DESCRIPTION - PAIN TYPE
TYPE: ACUTE PAIN
TYPE: ACUTE PAIN

## 2020-05-24 ASSESSMENT — PAIN DESCRIPTION - LOCATION
LOCATION: HEAD
LOCATION: HEAD

## 2020-05-24 ASSESSMENT — PAIN DESCRIPTION - DESCRIPTORS: DESCRIPTORS: HEADACHE

## 2020-05-24 NOTE — ED TRIAGE NOTES
Mode of arrival (squad #, walk in, police, etc) : Walk In        Chief complaint(s): Nausea, headache,sweats, dizziness        Arrival Note (brief scenario, treatment PTA, etc). : Pt arrives to ED c/o nausea. Patient states that she started feeling nauseated this morning. Patient states that when she dry heaves she gets severe pain in the back of her head. Patient states that she also has been feeling dizzy        C= \"Have you ever felt that you should Cut down on your drinking? \"  No  A= \"Have people Annoyed you by criticizing your drinking? \"  No  G= \"Have you ever felt bad or Guilty about your drinking? \"  No  E= \"Have you ever had a drink as an Eye-opener first thing in the morning to steady your nerves or to help a hangover? \"  No      Deferred []      Reason for deferring: N/A    *If yes to two or more: probable alcohol abuse. *

## 2020-05-25 NOTE — ED PROVIDER NOTES
distension. Palpations: Abdomen is soft. Tenderness: There is no abdominal tenderness. There is no guarding or rebound. Skin:     General: Skin is warm and dry. Capillary Refill: Capillary refill takes less than 2 seconds. Neurological:      Mental Status: She is alert. Comments: No cranial nerve deficit 5 out of 5 muscle strength in all extremities intact finger-nose no pronator drift able to ambulate without difficulty       DIAGNOSTIC RESULTS   LABS: All lab results were reviewed by myself, and all abnormals are listed below. Labs Reviewed   URINALYSIS - Abnormal; Notable for the following components:       Result Value    Turbidity UA TURBID (*)     Ketones, Urine MOD (*)     All other components within normal limits   MICROSCOPIC URINALYSIS - Abnormal; Notable for the following components:    Amorphous, UA 1+ (*)     All other components within normal limits   PREGNANCY, URINE       EMERGENCY DEPARTMENTCOURSE:   Differential diagnosis includes viral syndrome, tension headache, gastroenteritis, urinary tract infection. Without burning with urination or increased urinary frequency doubt UTI but will run a urine specimen. Clinically patient most likely has tension headache which could be caused by dehydration from a viral syndrome. She clinically appears well she is not tachycardic not tachypneic she is afebrile do not believe she has meningitis at this time. We will give her headache cocktail and reassess. 9:30 PM EDT  Patient's urinalysis is negative for infection. She is not pregnant. Patient feels much better after IV fluids Toradol Reglan she is tolerating oral intake she will be discharged.        Vitals:    Vitals:    05/24/20 1849   BP: 132/82   Pulse: 64   Resp: 15   Temp: 97.8 °F (36.6 °C)   TempSrc: Oral   SpO2: 100%   Weight: 190 lb (86.2 kg)   Height: 5' 3\" (1.6 m)       The patient was given the following medications while in the emergency department:  Orders Placed

## 2020-05-26 ENCOUNTER — CARE COORDINATION (OUTPATIENT)
Dept: CARE COORDINATION | Age: 20
End: 2020-05-26

## 2020-05-27 ENCOUNTER — TELEPHONE (OUTPATIENT)
Dept: FAMILY MEDICINE CLINIC | Age: 20
End: 2020-05-27

## 2020-05-27 NOTE — CARE COORDINATION
Patient contacted regarding Vishal Green. Discussed COVID-19 related testing which was not done at this time. Test results were not done. Patient informed of results, if available?     Care Transition Nurse/ Ambulatory Care Manager contacted the patient by telephone to perform post discharge assessment. Verified name and  with patient as identifiers. Provided introduction to self, and explanation of the CTN/ACM role, and reason for call due to risk factors for infection and/or exposure to COVID-19. Symptoms reviewed with patient who verbalized the following symptoms: no new symptoms. Due to no new or worsening symptoms encounter was not routed to provider for escalation. Patient has following risk factors of: no known risk factors. CTN/ACM reviewed discharge instructions, medical action plan and red flags such as increased shortness of breath, increasing fever and signs of decompensation with patient who verbalized understanding. Discussed exposure protocols and quarantine with CDC Guidelines What to do if you are sick with coronavirus disease 2019.  Patient was given an opportunity for questions and concerns. The patient agrees to contact the [facility] exposure line [phone], local health department patient declined and PCP office for questions related to their healthcare. CTN/ACM provided contact information for future needs. Reviewed and educated patient on any new and changed medications related to discharge diagnosis     Patient/family/caregiver given information for [facility] Loop and agrees to enroll yes  Patient's preferred e-mail:    Patient's preferred phone number: [phone]  Based on Loop alert triggers, patient will be contacted by nurse care manager for worsening symptoms. Pt will be further monitored by COVID Loop Team based on severity of symptoms and risk factors.

## 2020-05-27 NOTE — TELEPHONE ENCOUNTER
Care [facility] Phone Call     Hannah Mean at 2020  2:05 PM     Status: Sign when Signing Visit      Patient contacted regarding Zachary Lomeli. Discussed COVID-19 related testing which was not done at this time. Test results were not done. Patient informed of results, if available? na     Care Transition Nurse/ Ambulatory Care Manager contacted the patient by telephone to perform post discharge assessment. Verified name and  with patient as identifiers. Provided introduction to self, and explanation of the CTN/ACM role, and reason for call due to risk factors for infection and/or exposure to COVID-19.      Symptoms reviewed with patient who verbalized the following symptoms: no new symptoms. Due to no new or worsening symptoms encounter was not routed to provider for escalation.

## 2020-05-28 ENCOUNTER — NURSE TRIAGE (OUTPATIENT)
Dept: OTHER | Facility: CLINIC | Age: 20
End: 2020-05-28

## 2020-05-28 NOTE — TELEPHONE ENCOUNTER
Reason for Disposition   Patient wants to be seen    Answer Assessment - Initial Assessment Questions  1. LOCATION: \"Where does it hurt? \"       Back of neck  2. ONSET: \"When did the headache start? \" (Minutes, hours or days)       Last night  3. PATTERN: \"Does the pain come and go, or has it been constant since it started? \"      Comes and goes  4. SEVERITY: \"How bad is the pain? \" and \"What does it keep you from doing? \"  (e.g., Scale 1-10; mild, moderate, or severe)    - MILD (1-3): doesn't interfere with normal activities     - MODERATE (4-7): interferes with normal activities or awakens from sleep     - SEVERE (8-10): excruciating pain, unable to do any normal activities         10/10 when episode happening, 6/10 normally  5. RECURRENT SYMPTOM: \"Have you ever had headaches before? \" If so, ask: \"When was the last time? \" and \"What happened that time? \"       Yes migraines  6. CAUSE: \"What do you think is causing the headache? \"      unsure  7. MIGRAINE: \"Have you been diagnosed with migraine headaches? \" If so, ask: \"Is this headache similar? \"       Yes, Imitrex, states doesn't feel the same  8. HEAD INJURY: \"Has there been any recent injury to the head? \"       no  9. OTHER SYMPTOMS: \"Do you have any other symptoms? \" (fever, stiff neck, eye pain, sore throat, cold symptoms)      Stiff neck, tender around eyes  10. PREGNANCY: \"Is there any chance you are pregnant? \" \"When was your last menstrual period? \"        no    Protocols used: HEADACHE-ADULT-[place]

## 2020-06-10 ENCOUNTER — HOSPITAL ENCOUNTER (OUTPATIENT)
Dept: MRI IMAGING | Age: 20
Discharge: HOME | End: 2020-06-12
Payer: COMMERCIAL

## 2020-06-10 PROCEDURE — 70551 MRI BRAIN STEM W/O DYE: CPT

## 2020-06-10 RX ORDER — SODIUM CHLORIDE 0.9 % (FLUSH) 0.9 %
10 SYRINGE (ML) INJECTION PRN
Status: DISCONTINUED | OUTPATIENT
Start: 2020-06-10 | End: 2020-06-10

## 2020-12-13 ENCOUNTER — HOSPITAL ENCOUNTER (EMERGENCY)
Age: 20
Discharge: HOME | End: 2020-12-13
Attending: EMERGENCY MEDICINE
Payer: COMMERCIAL

## 2020-12-13 VITALS
DIASTOLIC BLOOD PRESSURE: 79 MMHG | BODY MASS INDEX: 31.01 KG/M2 | HEIGHT: 63 IN | OXYGEN SATURATION: 99 % | HEART RATE: 74 BPM | RESPIRATION RATE: 16 BRPM | WEIGHT: 175 LBS | SYSTOLIC BLOOD PRESSURE: 125 MMHG | TEMPERATURE: 98 F

## 2020-12-13 LAB
-: ABNORMAL
AMORPHOUS: ABNORMAL
BACTERIA: ABNORMAL
BILIRUBIN URINE: NEGATIVE
CASTS UA: ABNORMAL /LPF
COLOR: YELLOW
COMMENT UA: ABNORMAL
CRYSTALS, UA: ABNORMAL /HPF
EPITHELIAL CELLS UA: ABNORMAL /HPF
GLUCOSE URINE: NEGATIVE
HCG(URINE) PREGNANCY TEST: NEGATIVE
KETONES, URINE: NEGATIVE
LEUKOCYTE ESTERASE, URINE: ABNORMAL
MUCUS: ABNORMAL
NITRITE, URINE: NEGATIVE
OTHER OBSERVATIONS UA: ABNORMAL
PH UA: 6 (ref 5–8)
PROTEIN UA: NEGATIVE
RBC UA: ABNORMAL /HPF
RENAL EPITHELIAL, UA: ABNORMAL /HPF
SPECIFIC GRAVITY UA: 1.02 (ref 1–1.03)
TRICHOMONAS: ABNORMAL
TURBIDITY: ABNORMAL
URINE HGB: ABNORMAL
UROBILINOGEN, URINE: NORMAL
WBC UA: ABNORMAL /HPF
YEAST: ABNORMAL

## 2020-12-13 PROCEDURE — 81025 URINE PREGNANCY TEST: CPT

## 2020-12-13 PROCEDURE — 87186 SC STD MICRODIL/AGAR DIL: CPT

## 2020-12-13 PROCEDURE — 6370000000 HC RX 637 (ALT 250 FOR IP): Performed by: EMERGENCY MEDICINE

## 2020-12-13 PROCEDURE — 81001 URINALYSIS AUTO W/SCOPE: CPT

## 2020-12-13 PROCEDURE — 87077 CULTURE AEROBIC IDENTIFY: CPT

## 2020-12-13 PROCEDURE — 87086 URINE CULTURE/COLONY COUNT: CPT

## 2020-12-13 PROCEDURE — 99285 EMERGENCY DEPT VISIT HI MDM: CPT

## 2020-12-13 RX ORDER — CEPHALEXIN 250 MG/1
500 CAPSULE ORAL ONCE
Status: COMPLETED | OUTPATIENT
Start: 2020-12-13 | End: 2020-12-13

## 2020-12-13 RX ORDER — CEPHALEXIN 500 MG/1
500 CAPSULE ORAL 4 TIMES DAILY
Qty: 40 CAPSULE | Refills: 0 | Status: SHIPPED | OUTPATIENT
Start: 2020-12-13 | End: 2020-12-23

## 2020-12-13 RX ADMIN — CEPHALEXIN 500 MG: 250 CAPSULE ORAL at 09:00

## 2020-12-13 ASSESSMENT — ENCOUNTER SYMPTOMS
SHORTNESS OF BREATH: 0
EYE PAIN: 0
ABDOMINAL PAIN: 0
COLOR CHANGE: 0
BACK PAIN: 0

## 2020-12-13 ASSESSMENT — PAIN DESCRIPTION - LOCATION: LOCATION: BACK

## 2020-12-13 ASSESSMENT — PAIN SCALES - GENERAL: PAINLEVEL_OUTOF10: 3

## 2020-12-13 ASSESSMENT — PAIN DESCRIPTION - PAIN TYPE: TYPE: ACUTE PAIN

## 2020-12-13 ASSESSMENT — PAIN DESCRIPTION - ORIENTATION: ORIENTATION: RIGHT

## 2020-12-13 NOTE — ED PROVIDER NOTES
EMERGENCY DEPARTMENT ENCOUNTER    Pt Name: Alexis Burton  MRN: [id number]  [place] 2000  Date of evaluation: 12/13/20  CHIEF COMPLAINT       Chief Complaint   Patient presents with    Urinary Frequency     HISTORY OF PRESENT ILLNESS   79-year-old female presents complaint of urinary frequency and urgency. Patient states over the last 3 days she has had increased feelings of frequency and urgency. Patient states every time she goes she only goes a small amount and then feels like she has to go again right after that. Patient denies any dysuria, patient does states she has a history of prior urinary tract infection and this feels similar. Patient also complaining of some mild right flank pain as well associated with this. Patient denies any fevers, chills, chest pain, shortness of breath, nausea or vomiting. Patient states her last period was the beginning of December and was normal.  She denies any vaginal bleeding, vaginal discharge, concern for STI at this time. The history is provided by the patient. REVIEW OF SYSTEMS     Review of Systems   Constitutional: Negative for fever. HENT: Negative for congestion and ear pain. Eyes: Negative for pain. Respiratory: Negative for shortness of breath. Cardiovascular: Negative for chest pain, palpitations and leg swelling. Gastrointestinal: Negative for abdominal pain. Genitourinary: Positive for flank pain, frequency and urgency. Negative for dysuria. Musculoskeletal: Negative for back pain. Skin: Negative for color change. Neurological: Negative for numbness and headaches. Psychiatric/Behavioral: Negative for confusion. All other systems reviewed and are negative.     PASTMEDICAL HISTORY     Past Medical History:   Diagnosis Date    Allergic rhinitis     Ankle fracture 11/2/2012 08/2005     Contact dermatitis 11/2/2012    Obesity 11/2/2012     Past Problem List  Patient Active Problem List   Diagnosis Code    Patient overweight E66.3    Allergic rhinitis J30.9    DUB  N93.8    Dysmenorrhea N94.6     SURGICAL HISTORY     History reviewed. No pertinent surgical history. CURRENT MEDICATIONS       Discharge Medication List as of 2020  9:32 AM      CONTINUE these medications which have NOT CHANGED    Details   propranolol (INDERAL LA) 60 MG extended release capsule Take 1 capsule by mouth daily, Disp-30 capsule, R-3Normal      SUMAtriptan (IMITREX) 100 MG tablet Take 1 tablet by mouth once as needed for Migraine, Disp-9 tablet, R-3Normal      ondansetron (ZOFRAN ODT) 4 MG disintegrating tablet Take 1 tablet by mouth every 8 hours as needed for Nausea or Vomiting, Disp-30 tablet, R-3Normal      sertraline (ZOLOFT) 50 MG tablet Take 1 tablet by mouth daily, Disp-90 tablet, R-1Normal           ALLERGIES     has No Known Allergies. FAMILY HISTORY     She indicated that her mother is alive. She indicated that her father is alive. She indicated that her sister is alive. She indicated that her brother is . She indicated that her maternal grandmother is . She indicated that her maternal grandfather is alive. She indicated that her paternal grandfather is . She indicated that her maternal aunt is alive. She indicated that the status of her other is unknown. SOCIAL HISTORY       Social History     Tobacco Use    Smoking status: Passive Smoke Exposure - Never Smoker    Smokeless tobacco: Never Used    Tobacco comment: mom smokes outside   Substance Use Topics    Alcohol use: No    Drug use: Yes     Types: Marijuana     PHYSICAL EXAM     INITIAL VITALS: /79   Pulse 74   Temp 98 °F (36.7 °C) (Oral)   Resp 16   Ht 5' 3\" (1.6 m)   Wt 175 lb (79.4 kg)   SpO2 99%   BMI 31.00 kg/m²    Physical Exam  Vitals signs and nursing note reviewed. Constitutional:       General: She is not in acute distress. Appearance: Normal appearance. She is not toxic-appearing.    HENT:      Head: Normocephalic and atraumatic. Nose: Nose normal.      Mouth/Throat:      Mouth: Mucous membranes are moist.      Pharynx: Oropharynx is clear. Eyes:      Extraocular Movements: Extraocular movements intact. Conjunctiva/sclera: Conjunctivae normal.   Neck:      Musculoskeletal: Normal range of motion. Cardiovascular:      Rate and Rhythm: Normal rate and regular rhythm. Pulses: Normal pulses. Heart sounds: Normal heart sounds. Pulmonary:      Effort: Pulmonary effort is normal.      Breath sounds: Normal breath sounds. Abdominal:      General: Bowel sounds are normal. There is no distension. Palpations: Abdomen is soft. Tenderness: There is abdominal tenderness in the suprapubic area. There is right CVA tenderness. Musculoskeletal: Normal range of motion. Skin:     General: Skin is warm and dry. Capillary Refill: Capillary refill takes less than 2 seconds. Neurological:      General: No focal deficit present. Mental Status: She is alert. Psychiatric:         Mood and Affect: Mood normal.         MEDICAL DECISION MAKIN-year-old female presents with complaint of urinary frequency urgency and right flank pain. On initial exam patient is no acute distress, vitals are stable, patient is afebrile, patient with suprapubic tenderness palpation as well as some CVA tenderness on the right side, will obtain UA and urine pregnancy test.    UA was reviewed consistent with acute UTI, will give patient 1 g Rocephin prior to discharge home due to possibility of pyelonephrosis with the flank pain, will also start patient on oral antibiotics at home. Results were discussed with the patient, discussed return precautions including worsening of pain, development of fevers, development of nausea vomiting or other concerning symptoms. Discussed need for follow-up with PCP as well. Patient voiced understanding and is comfortable discharge home.     Patient/Guardian was informed of their diagnosis and told to follow up with PCP in 1-3 days. Patient demonstrates understanding and agreement with the plan. They were given the opportunity to ask questions and those questions were answered to the best of our ability with the available information. Patient/Guardian told to return to the ED for any new, worsening, changing or persistent symptoms. This dictation was prepared using [employer] voice recognition software. As a result, errors may have occurred. When identified, these errors have been corrected. While every attempt is made to correct errors in dictation, errors may still exist.          CRITICAL CARE:       PROCEDURES:    Procedures    DIAGNOSTIC RESULTS   EKG:All EKG's are interpreted by the Emergency Department Physician who either signs or Co-signs this chart in the absence of a cardiologist.        RADIOLOGY:All plain film, CT, MRI, and formal ultrasound images (except ED bedside ultrasound) are read by the radiologist, see reports below, unless otherwisenoted in MDM or here. No orders to display     LABS: All lab results were reviewed by myself, and all abnormals are listed below.   Labs Reviewed   URINALYSIS - Abnormal; Notable for the following components:       Result Value    Turbidity UA CLOUDY (*)     Urine Hgb SMALL (*)     Leukocyte Esterase, Urine SMALL (*)     All other components within normal limits   MICROSCOPIC URINALYSIS - Abnormal; Notable for the following components:    Bacteria, UA FEW (*)     All other components within normal limits   CULTURE, URINE   PREGNANCY, URINE       EMERGENCY DEPARTMENTCOURSE:         Vitals:    Vitals:    12/13/20 0802   BP: 125/79   Pulse: 74   Resp: 16   Temp: 98 °F (36.7 °C)   TempSrc: Oral   SpO2: 99%   Weight: 175 lb (79.4 kg)   Height: 5' 3\" (1.6 m)       The patient was given the following medications while in the emergency department:  Orders Placed This Encounter   Medications    DISCONTD: cefTRIAXone (ROCEPHIN) 1 g IVPB in 50 mL D5W minibag     Order Specific Question:   Antimicrobial Indications     Answer:   Urinary Tract Infection    cephALEXin (KEFLEX) capsule 500 mg     Order Specific Question:   Antimicrobial Indications     Answer:   Urinary Tract Infection    cephALEXin (KEFLEX) 500 MG capsule     Sig: Take 1 capsule by mouth 4 times daily for 10 days     Dispense:  40 capsule     Refill:  0     CONSULTS:  None    FINAL IMPRESSION      1.  Acute cystitis with hematuria          DISPOSITION/PLAN   DISPOSITION Decision To Discharge 12/13/2020 09:31:03 AM      PATIENT REFERRED TO:  ANI Naranjo CNP  [address] [address]  [phone]    Schedule an appointment as soon as possible for a visit       [facility] ED  [facility] [address] [phone]    As needed, If symptoms worsen    DISCHARGE MEDICATIONS:  Discharge Medication List as of 12/13/2020  9:32 AM      START taking these medications    Details   cephALEXin (KEFLEX) 500 MG capsule Take 1 capsule by mouth 4 times daily for 10 days, Disp-40 capsule, R-0Print           Kaley Atkinson DO  Attending Emergency Physician                  Kaley Atkinson DO  12/13/20 9561

## 2020-12-13 NOTE — ED NOTES
Mode of arrival (squad #, walk in, police, etc) : walk in      Chief complaint(s): urinary frequency      Arrival Note (brief scenario, treatment PTA, etc). : Pt arrives to ED c/o urinary frequency and urgency. Pt states this has been going on for a few days. Pt also c/o right moderate flank pain. Pt is A&Ox4, eupneic, PWD. GCS=15. Call light in reach. C= \"Have you ever felt that you should Cut down on your drinking? \"  No  A= \"Have people Annoyed you by criticizing your drinking? \"  No  G= \"Have you ever felt bad or Guilty about your drinking? \"  No  E= \"Have you ever had a drink as an Eye-opener first thing in the morning to steady your nerves or to help a hangover? \"  No      Deferred []      Reason for deferring: N/A    *If yes to two or more: probable alcohol abuse. Deidra Mac RN  12/13/20 2072

## 2020-12-14 LAB
CULTURE: ABNORMAL
Lab: ABNORMAL
SPECIMEN DESCRIPTION: ABNORMAL

## 2020-12-15 NOTE — RESULT ENCOUNTER NOTE
Prescription from Dr. Ramsey Quintanilla: Bactrim DS 1 tab BID x14 days. Stop cephalexin. Patient was contacted about results and treatment plan. She requests prescription be called to [facility]. Prescription was left on voicemail at the preferred location.

## 2020-12-17 ENCOUNTER — HOSPITAL ENCOUNTER (EMERGENCY)
Age: 20
Discharge: HOME | End: 2020-12-17
Attending: STUDENT IN AN ORGANIZED HEALTH CARE EDUCATION/TRAINING PROGRAM
Payer: COMMERCIAL

## 2020-12-17 VITALS
RESPIRATION RATE: 16 BRPM | WEIGHT: 175 LBS | SYSTOLIC BLOOD PRESSURE: 125 MMHG | DIASTOLIC BLOOD PRESSURE: 71 MMHG | HEART RATE: 94 BPM | TEMPERATURE: 98.2 F | HEIGHT: 63 IN | OXYGEN SATURATION: 98 % | BODY MASS INDEX: 31.01 KG/M2

## 2020-12-17 LAB
ABSOLUTE EOS #: 0 K/UL (ref 0–0.4)
ABSOLUTE IMMATURE GRANULOCYTE: ABNORMAL K/UL (ref 0–0.3)
ABSOLUTE LYMPH #: 1.2 K/UL (ref 1–4.8)
ABSOLUTE MONO #: 0.4 K/UL (ref 0.1–1.3)
ALBUMIN SERPL-MCNC: 4.4 G/DL (ref 3.5–5.2)
ALBUMIN/GLOBULIN RATIO: ABNORMAL (ref 1–2.5)
ALP BLD-CCNC: 75 U/L (ref 35–104)
ALT SERPL-CCNC: 9 U/L (ref 5–33)
ANION GAP SERPL CALCULATED.3IONS-SCNC: 10 MMOL/L (ref 9–17)
AST SERPL-CCNC: 17 U/L
BASOPHILS # BLD: 1 % (ref 0–2)
BASOPHILS ABSOLUTE: 0 K/UL (ref 0–0.2)
BILIRUB SERPL-MCNC: 0.22 MG/DL (ref 0.3–1.2)
BUN BLDV-MCNC: 8 MG/DL (ref 6–20)
BUN/CREAT BLD: ABNORMAL (ref 9–20)
CALCIUM SERPL-MCNC: 9.4 MG/DL (ref 8.6–10.4)
CHLORIDE BLD-SCNC: 104 MMOL/L (ref 98–107)
CO2: 22 MMOL/L (ref 20–31)
CREAT SERPL-MCNC: 0.94 MG/DL (ref 0.5–0.9)
DIFFERENTIAL TYPE: ABNORMAL
EOSINOPHILS RELATIVE PERCENT: 0 % (ref 0–4)
GFR AFRICAN AMERICAN: >60 ML/MIN
GFR NON-AFRICAN AMERICAN: >60 ML/MIN
GFR SERPL CREATININE-BSD FRML MDRD: ABNORMAL ML/MIN/{1.73_M2}
GFR SERPL CREATININE-BSD FRML MDRD: ABNORMAL ML/MIN/{1.73_M2}
GLUCOSE BLD-MCNC: 89 MG/DL (ref 70–99)
HCT VFR BLD CALC: 38 % (ref 36–46)
HEMOGLOBIN: 13.1 G/DL (ref 12–16)
IMMATURE GRANULOCYTES: ABNORMAL %
LYMPHOCYTES # BLD: 17 % (ref 25–45)
MCH RBC QN AUTO: 32.2 PG (ref 26–34)
MCHC RBC AUTO-ENTMCNC: 34.5 G/DL (ref 31–37)
MCV RBC AUTO: 93.4 FL (ref 80–100)
MONOCYTES # BLD: 6 % (ref 2–8)
NRBC AUTOMATED: ABNORMAL PER 100 WBC
PDW BLD-RTO: 12.9 % (ref 11.5–14.9)
PLATELET # BLD: 190 K/UL (ref 150–450)
PLATELET ESTIMATE: ABNORMAL
PMV BLD AUTO: 9.9 FL (ref 6–12)
POTASSIUM SERPL-SCNC: 4.3 MMOL/L (ref 3.7–5.3)
RBC # BLD: 4.07 M/UL (ref 4–5.2)
RBC # BLD: ABNORMAL 10*6/UL
SEG NEUTROPHILS: 76 % (ref 34–64)
SEGMENTED NEUTROPHILS ABSOLUTE COUNT: 5.5 K/UL (ref 1.3–9.1)
SODIUM BLD-SCNC: 136 MMOL/L (ref 135–144)
TOTAL PROTEIN: 6.9 G/DL (ref 6.4–8.3)
WBC # BLD: 7.3 K/UL (ref 4.5–13.5)
WBC # BLD: ABNORMAL 10*3/UL

## 2020-12-17 PROCEDURE — 96374 THER/PROPH/DIAG INJ IV PUSH: CPT

## 2020-12-17 PROCEDURE — 99284 EMERGENCY DEPT VISIT MOD MDM: CPT

## 2020-12-17 PROCEDURE — 6360000002 HC RX W HCPCS: Performed by: STUDENT IN AN ORGANIZED HEALTH CARE EDUCATION/TRAINING PROGRAM

## 2020-12-17 PROCEDURE — 2580000003 HC RX 258: Performed by: STUDENT IN AN ORGANIZED HEALTH CARE EDUCATION/TRAINING PROGRAM

## 2020-12-17 PROCEDURE — 96375 TX/PRO/DX INJ NEW DRUG ADDON: CPT

## 2020-12-17 PROCEDURE — 36415 COLL VENOUS BLD VENIPUNCTURE: CPT

## 2020-12-17 PROCEDURE — 80053 COMPREHEN METABOLIC PANEL: CPT

## 2020-12-17 PROCEDURE — 85025 COMPLETE CBC W/AUTO DIFF WBC: CPT

## 2020-12-17 RX ORDER — ONDANSETRON 2 MG/ML
4 INJECTION INTRAMUSCULAR; INTRAVENOUS ONCE
Status: COMPLETED | OUTPATIENT
Start: 2020-12-17 | End: 2020-12-17

## 2020-12-17 RX ORDER — 0.9 % SODIUM CHLORIDE 0.9 %
1000 INTRAVENOUS SOLUTION INTRAVENOUS ONCE
Status: COMPLETED | OUTPATIENT
Start: 2020-12-17 | End: 2020-12-17

## 2020-12-17 RX ORDER — KETOROLAC TROMETHAMINE 30 MG/ML
30 INJECTION, SOLUTION INTRAMUSCULAR; INTRAVENOUS ONCE
Status: COMPLETED | OUTPATIENT
Start: 2020-12-17 | End: 2020-12-17

## 2020-12-17 RX ADMIN — ONDANSETRON 4 MG: 2 INJECTION INTRAMUSCULAR; INTRAVENOUS at 12:06

## 2020-12-17 RX ADMIN — KETOROLAC TROMETHAMINE 30 MG: 30 INJECTION, SOLUTION INTRAMUSCULAR; INTRAVENOUS at 12:07

## 2020-12-17 RX ADMIN — SODIUM CHLORIDE 1000 ML: 9 INJECTION, SOLUTION INTRAVENOUS at 12:06

## 2020-12-17 ASSESSMENT — ENCOUNTER SYMPTOMS
VOMITING: 0
NAUSEA: 0
FACIAL SWELLING: 0
EYE ITCHING: 0
DIARRHEA: 0
ABDOMINAL PAIN: 0
COUGH: 0
BACK PAIN: 1
PHOTOPHOBIA: 0
COLOR CHANGE: 0
SHORTNESS OF BREATH: 0
RHINORRHEA: 0

## 2020-12-17 ASSESSMENT — PAIN SCALES - GENERAL: PAINLEVEL_OUTOF10: 6

## 2020-12-17 NOTE — ED PROVIDER NOTES
EMERGENCY DEPARTMENT ENCOUNTER    Pt Name: Jeremy Burnett  MRN: [id number]  [place] 2000  Date of evaluation: 12/17/20  CHIEF COMPLAINT       Chief Complaint   Patient presents with    Back Pain    Emesis    Chills     HISTORY OF PRESENT ILLNESS   HPI  71-year-old female history of allergic rhinitis presents with chief complaint of vomiting. Patient was seen in the ED 4 days ago and diagnosed with urinary tract infection. Given a dose of ceftriaxone and discharged home in comfortable condition. Since that time her urine culture grew out ESBL E. coli. Antibiotics initially were Keflex switched to Bactrim 2 days ago. Patient has been vomiting this morning. 4-5 times. Nonbloody nonbilious. No diarrhea. No GYN symptoms. No fever chills. Does report some low back pain. Symptoms are moderate and intermittent. REVIEW OF SYSTEMS     Review of Systems   Constitutional: Negative for chills and fatigue. HENT: Negative for facial swelling, postnasal drip and rhinorrhea. Eyes: Negative for photophobia and itching. Respiratory: Negative for cough and shortness of breath. Cardiovascular: Negative for chest pain and leg swelling. Gastrointestinal: Negative for abdominal pain, diarrhea, nausea and vomiting. Genitourinary: Positive for urgency. Negative for dysuria, flank pain and hematuria. Musculoskeletal: Positive for back pain. Negative for arthralgias and joint swelling. Skin: Negative for color change and rash. Neurological: Negative for dizziness, numbness and headaches. PASTMEDICAL HISTORY     Past Medical History:   Diagnosis Date    Allergic rhinitis     Ankle fracture 11/2/2012 08/2005     Contact dermatitis 11/2/2012    Obesity 11/2/2012     Past Problem List  Patient Active Problem List   Diagnosis Code    Patient overweight E66.3    Allergic rhinitis J30.9    DUB  N93.8    Dysmenorrhea N94.6     SURGICAL HISTORY     History reviewed.  No pertinent surgical history. CURRENT MEDICATIONS       Discharge Medication List as of 2020  1:19 PM      CONTINUE these medications which have NOT CHANGED    Details   cephALEXin (KEFLEX) 500 MG capsule Take 1 capsule by mouth 4 times daily for 10 days, Disp-40 capsule, R-0Print      propranolol (INDERAL LA) 60 MG extended release capsule Take 1 capsule by mouth daily, Disp-30 capsule, R-3Normal      SUMAtriptan (IMITREX) 100 MG tablet Take 1 tablet by mouth once as needed for Migraine, Disp-9 tablet, R-3Normal      ondansetron (ZOFRAN ODT) 4 MG disintegrating tablet Take 1 tablet by mouth every 8 hours as needed for Nausea or Vomiting, Disp-30 tablet, R-3Normal      sertraline (ZOLOFT) 50 MG tablet Take 1 tablet by mouth daily, Disp-90 tablet, R-1Normal           ALLERGIES     has No Known Allergies. FAMILY HISTORY     She indicated that her mother is alive. She indicated that her father is alive. She indicated that her sister is alive. She indicated that her brother is . She indicated that her maternal grandmother is . She indicated that her maternal grandfather is alive. She indicated that her paternal grandfather is . She indicated that her maternal aunt is alive. She indicated that the status of her other is unknown. SOCIAL HISTORY       Social History     Tobacco Use    Smoking status: Passive Smoke Exposure - Never Smoker    Smokeless tobacco: Never Used    Tobacco comment: mom smokes outside   Substance Use Topics    Alcohol use: No    Drug use: Yes     Types: Marijuana     PHYSICAL EXAM     INITIAL VITALS: /71   Pulse 94   Temp 98.2 °F (36.8 °C) (Oral)   Resp 16   Ht 5' 3\" (1.6 m)   Wt 175 lb (79.4 kg)   LMP 2020   SpO2 98%   BMI 31.00 kg/m²    Physical Exam  Constitutional:       Appearance: She is normal weight. HENT:      Head: Normocephalic and atraumatic. Eyes:      Extraocular Movements: Extraocular movements intact.       Pupils: Pupils are equal, round,

## 2021-05-18 ENCOUNTER — HOSPITAL ENCOUNTER (EMERGENCY)
Age: 21
Discharge: HOME | End: 2021-05-18
Attending: EMERGENCY MEDICINE
Payer: COMMERCIAL

## 2021-05-18 VITALS
HEART RATE: 78 BPM | TEMPERATURE: 97 F | HEIGHT: 63 IN | OXYGEN SATURATION: 99 % | BODY MASS INDEX: 30.12 KG/M2 | SYSTOLIC BLOOD PRESSURE: 118 MMHG | WEIGHT: 170 LBS | DIASTOLIC BLOOD PRESSURE: 78 MMHG | RESPIRATION RATE: 16 BRPM

## 2021-05-18 DIAGNOSIS — J01.00 ACUTE NON-RECURRENT MAXILLARY SINUSITIS: Primary | ICD-10-CM

## 2021-05-18 PROCEDURE — 99284 EMERGENCY DEPT VISIT MOD MDM: CPT

## 2021-05-18 RX ORDER — AMOXICILLIN AND CLAVULANATE POTASSIUM 875; 125 MG/1; MG/1
1 TABLET, FILM COATED ORAL 2 TIMES DAILY
Qty: 20 TABLET | Refills: 0 | Status: SHIPPED | OUTPATIENT
Start: 2021-05-18 | End: 2021-05-28

## 2021-05-18 RX ORDER — PSEUDOEPHEDRINE HYDROCHLORIDE 30 MG/1
30 TABLET ORAL EVERY 4 HOURS PRN
Qty: 21 TABLET | Refills: 1 | Status: SHIPPED | OUTPATIENT
Start: 2021-05-18 | End: 2021-06-24

## 2021-05-18 ASSESSMENT — PAIN DESCRIPTION - LOCATION: LOCATION: FACE

## 2021-05-18 ASSESSMENT — PAIN SCALES - GENERAL: PAINLEVEL_OUTOF10: 7

## 2021-05-18 ASSESSMENT — PAIN DESCRIPTION - DESCRIPTORS: DESCRIPTORS: POUNDING

## 2021-05-18 ASSESSMENT — PAIN DESCRIPTION - FREQUENCY: FREQUENCY: CONTINUOUS

## 2021-05-18 NOTE — ED PROVIDER NOTES
[address]  eMERGENCY dEPARTMENT eNCOUnter      Pt Name: Maureen Pantoja  MRN: [id number]  [place] 2000  Date of evaluation: 5/18/2021  Provider: Terrence Delong PA-C    CHIEF COMPLAINT       Chief Complaint   Patient presents with    Facial Pain           HISTORY OF PRESENT ILLNESS  (Location/Symptom, Timing/Onset, Context/Setting, Quality, Duration, Modifying Factors, Severity.)   Maureen Pantoja is a 23 y.o. female who presents to the emergency department for evaluation of sinusitis. Symptoms began 10 days ago. Pt reports she has had nasal congestion, facial pain/ pressure, headache, ear pain, post nasal drip, and mild cough. She denies fever, chills, chest pain, sob, nausea, emesis, abd pain. Pt has tried OTC cold and sinus with no relief. States she get sinus infections often. No other complaints. Nursing Notes were reviewed. REVIEW OF SYSTEMS    (2-9 systems for level 4, 10 or more for level 5)     Review of Systems   Nasal congestion  Facial pain/ pressure  Headache  Ear pain  Post nasal drip  Cough      Except as noted above the remainder of the review of systems was reviewed and negative. PAST MEDICAL HISTORY     Past Medical History:   Diagnosis Date    Allergic rhinitis     Ankle fracture 11/2/2012 08/2005     Contact dermatitis 11/2/2012    Obesity 11/2/2012     None otherwise stated in nurses notes    SURGICAL HISTORY     History reviewed. No pertinent surgical history. None otherwise stated in nurses notes    CURRENT MEDICATIONS       Previous Medications    PROPRANOLOL (INDERAL LA) 60 MG EXTENDED RELEASE CAPSULE    Take 1 capsule by mouth daily    SERTRALINE (ZOLOFT) 50 MG TABLET    Take 1 tablet by mouth daily    SUMATRIPTAN (IMITREX) 100 MG TABLET    Take 1 tablet by mouth once as needed for Migraine       ALLERGIES     Patient has no known allergies.     FAMILY HISTORY           Problem Relation Age of Onset    Arthritis Father     Mental Illness Father tenderness. no rhonchi. Abdominal: Soft. Bowel sounds are normal. No distension and no mass. There is no tenderness. There is no rebound and no guarding. Musculoskeletal: Normal range of motion. Neurological: Alert and oriented to person, place, and time. GCS score is 15. Skin: Skin is warm and dry. No rash noted. No erythema. No pallor. Psychiatric: Mood, memory, affect and judgment normal.           DIAGNOSTIC RESULTS     EKG: All EKG's are interpreted by the Emergency Department Physician who either signs or Co-signs this chart in the absence of a cardiologist.        RADIOLOGY:   All plain film, CT, MRI, and formal ultrasound images (except ED bedside ultrasound) are read by the radiologist, see reports below, unless otherwise noted in MDM or here. No orders to display       No results found. LABS:  Labs Reviewed - No data to display    All other labs were within normal range or not returned as of this dictation. EMERGENCY DEPARTMENT COURSE and DIFFERENTIAL DIAGNOSIS/MDM:   Vitals:    Vitals:    05/18/21 0900   BP: 118/78   Pulse: 78   Resp: 16   Temp: 97 °F (36.1 °C)   TempSrc: Oral   SpO2: 99%   Weight: 170 lb (77.1 kg)   Height: 5' 3\" (1.6 m)         Patient instructed to return to the emergency room if symptoms worsen, return, or any other concern right away which is agreed by the patient    ED MEDS:  Orders Placed This Encounter   Medications    amoxicillin-clavulanate (AUGMENTIN) 875-125 MG per tablet     Sig: Take 1 tablet by mouth 2 times daily for 10 days     Dispense:  20 tablet     Refill:  0    pseudoephedrine (DECONGESTANT) 30 MG tablet     Sig: Take 1 tablet by mouth every 4 hours as needed for Congestion     Dispense:  21 tablet     Refill:  1         CONSULTS:  None    PROCEDURES:  None      FINAL IMPRESSION      1.  Acute non-recurrent maxillary sinusitis          DISPOSITION/PLAN   DISPOSITION     PATIENT REFERRED TO:  Tonya King, APRN - [address] [facility] [address] [facility]  [place] [address]  [phone]          DISCHARGE MEDICATIONS:  New Prescriptions    AMOXICILLIN-CLAVULANATE (AUGMENTIN) 875-125 MG PER TABLET    Take 1 tablet by mouth 2 times daily for 10 days    PSEUDOEPHEDRINE (DECONGESTANT) 30 MG TABLET    Take 1 tablet by mouth every 4 hours as needed for Congestion         Summation      Patient Course: As pressure, facial pain, headache, ear pain, postnasal drip, mild cough x10 days. Patient has tried over-the-counter cold and sinus medication with no relief. Patient reports she gets sinus infections often. Vital signs stable. Will start on antibiotic and Sudafed. Patient denies pregnancy. Recommend close follow-up with PCP. Strict return instructions discussed with patient. She is agreeable. Discussed results and plan with the pt. They expressed appropriate understanding. Pt given close follow up, supportive care instructions and strict return instructions at the bedside. #331 & 332:  Antibiotic use with Sinusitis  *if the second, third, or fourth prompt is selected, only then should the clinician see the sub-prompts addressing amoxicillin   [ ] The patient has sinusitis and antibiotics are not indicated/not prescribed at this time. [SATISFIES MIPS PERFORMANCE]  [ X] The patient has sinusitis with symptom onset greater than 10 days ago and the patient was prescribed antibiotics. [SATISFIES MIPS PERFORMANCE]  Mauricio ] Patient was prescribed an amoxicillin-based antibiotic.            ED Medications administered this visit:  Medications - No data to display    New Prescriptions from this visit:    New Prescriptions    AMOXICILLIN-CLAVULANATE (AUGMENTIN) 875-125 MG PER TABLET    Take 1 tablet by mouth 2 times daily for 10 days    PSEUDOEPHEDRINE (DECONGESTANT) 30 MG TABLET    Take 1 tablet by mouth every 4 hours as needed for Congestion       Follow-up:  Katya Pandey Tara Russell, ANI - [facility]  [address] [facility]  [address] [address]  [phone]            Final Impression:   1.  Acute non-recurrent maxillary sinusitis               (Please note that portions of this note were completed with a voice recognition program.  Efforts were made to edit the dictations but occasionally words are mis-transcribed.)      (Please note that portions of this note were completed with a voice recognition program.  Efforts were made to edit the dictations but occasionally words are mis-transcribed.)    Nahid Hernandez. Carolyn 81, PA-C  05/18/21 1033

## 2021-05-18 NOTE — ED PROVIDER NOTES
eMERGENCY dEPARTMENT eNCOUnter   Independent Attestation     Pt Name: Rob Hutson  MRN: [id number]  [place] 2000  Date of evaluation: 5/18/21     Rob Hutson is a 23 y.o. female with CC: Facial Pain      Based on the medical record the care appears appropriate. I was personally available for consultation in the Emergency Department.     Lissett Vidal DO  Attending Emergency Physician                  Lissett Vidal DO  05/18/21 6485

## 2021-06-02 ENCOUNTER — HOSPITAL ENCOUNTER (OUTPATIENT)
Age: 21
Setting detail: SPECIMEN
Discharge: HOME | End: 2021-06-02
Payer: COMMERCIAL

## 2021-06-03 DIAGNOSIS — Z11.3 ROUTINE SCREENING FOR STI (SEXUALLY TRANSMITTED INFECTION): ICD-10-CM

## 2021-06-03 LAB
HPV SAMPLE: ABNORMAL
HPV, GENOTYPE 16: NOT DETECTED
HPV, GENOTYPE 18: NOT DETECTED
HPV, HIGH RISK OTHER: DETECTED
HPV, INTERPRETATION: ABNORMAL
SPECIMEN DESCRIPTION: ABNORMAL

## 2021-06-04 LAB
CHLAMYDIA BY THIN PREP: NEGATIVE
N. GONORRHOEAE DNA, THIN PREP: NEGATIVE
SPECIMEN DESCRIPTION: NORMAL

## 2021-06-08 LAB — CYTOLOGY REPORT: NORMAL

## 2021-06-24 ENCOUNTER — OFFICE VISIT (OUTPATIENT)
Dept: OBGYN CLINIC | Age: 21
End: 2021-06-24
Payer: COMMERCIAL

## 2021-06-24 VITALS — BODY MASS INDEX: 29.24 KG/M2 | SYSTOLIC BLOOD PRESSURE: 118 MMHG | WEIGHT: 165 LBS | DIASTOLIC BLOOD PRESSURE: 70 MMHG

## 2021-06-24 DIAGNOSIS — Z71.2 ENCOUNTER TO DISCUSS TEST RESULTS: Primary | ICD-10-CM

## 2021-06-24 PROBLEM — N93.8 DUB (DYSFUNCTIONAL UTERINE BLEEDING): Status: RESOLVED | Noted: 2017-02-27 | Resolved: 2021-06-24

## 2021-06-24 PROCEDURE — 99203 OFFICE O/P NEW LOW 30 MIN: CPT | Performed by: CLINICAL NURSE SPECIALIST

## 2021-06-24 PROCEDURE — G8417 CALC BMI ABV UP PARAM F/U: HCPCS | Performed by: CLINICAL NURSE SPECIALIST

## 2021-06-24 PROCEDURE — 1036F TOBACCO NON-USER: CPT | Performed by: CLINICAL NURSE SPECIALIST

## 2021-06-24 PROCEDURE — G8427 DOCREV CUR MEDS BY ELIG CLIN: HCPCS | Performed by: CLINICAL NURSE SPECIALIST

## 2021-06-24 SDOH — ECONOMIC STABILITY: TRANSPORTATION INSECURITY
IN THE PAST 12 MONTHS, HAS LACK OF TRANSPORTATION KEPT YOU FROM MEETINGS, WORK, OR FROM GETTING THINGS NEEDED FOR DAILY LIVING?: NO

## 2021-06-24 SDOH — ECONOMIC STABILITY: FOOD INSECURITY: WITHIN THE PAST 12 MONTHS, THE FOOD YOU BOUGHT JUST DIDN'T LAST AND YOU DIDN'T HAVE MONEY TO GET MORE.: NEVER TRUE

## 2021-06-24 SDOH — ECONOMIC STABILITY: TRANSPORTATION INSECURITY
IN THE PAST 12 MONTHS, HAS THE LACK OF TRANSPORTATION KEPT YOU FROM MEDICAL APPOINTMENTS OR FROM GETTING MEDICATIONS?: NO

## 2021-06-24 SDOH — ECONOMIC STABILITY: FOOD INSECURITY: WITHIN THE PAST 12 MONTHS, YOU WORRIED THAT YOUR FOOD WOULD RUN OUT BEFORE YOU GOT MONEY TO BUY MORE.: NEVER TRUE

## 2021-06-24 ASSESSMENT — ENCOUNTER SYMPTOMS: RESPIRATORY NEGATIVE: 1

## 2021-06-24 ASSESSMENT — SOCIAL DETERMINANTS OF HEALTH (SDOH): HOW HARD IS IT FOR YOU TO PAY FOR THE VERY BASICS LIKE FOOD, HOUSING, MEDICAL CARE, AND HEATING?: NOT HARD AT ALL

## 2021-06-24 NOTE — PROGRESS NOTES
Subjective:      Patient ID:  Jaquelin Sun is a 23 y.o. female who presents for   Chief Complaint   Patient presents with    Other     Establish care       HPI     New patient is a 22 yo female who was referred for normal cytology pap but other high risk HPV was detected. Review of Systems   Constitutional: Negative. Respiratory: Negative. Cardiovascular: Negative. Genitourinary: Negative for dysuria, menstrual problem and vaginal discharge. Musculoskeletal: Negative. Skin: Negative. Psychiatric/Behavioral: Negative. /70 (Site: Right Upper Arm, Position: Sitting)   Wt 165 lb (74.8 kg)   LMP 06/12/2021 (Approximate)   BMI 29.24 kg/m²    Patient's last menstrual period was 06/12/2021 (approximate). Family History   Problem Relation Age of Onset    Arthritis Father     Mental Illness Father     Asthma Sister     High Blood Pressure Sister     Early Death Brother     Diabetes Maternal Grandmother     Hearing Loss Maternal Grandmother     Stroke Maternal Grandmother     Hearing Loss Maternal Grandfather     Other Mother         spinal deformity    Heart Attack Mother     High Blood Pressure Mother     Hearing Loss Other       Past Medical History:   Diagnosis Date    Allergic rhinitis     Ankle fracture 11/2/2012 08/2005     Contact dermatitis 11/2/2012    Obesity 11/2/2012      History reviewed. No pertinent surgical history.    Social History     Socioeconomic History    Marital status: Single     Spouse name: None    Number of children: None    Years of education: None    Highest education level: None   Occupational History    None   Tobacco Use    Smoking status: Passive Smoke Exposure - Never Smoker    Smokeless tobacco: Never Used    Tobacco comment: mom smokes outside   Substance and Sexual Activity    Alcohol use: No    Drug use: Yes     Types: Marijuana    Sexual activity: Yes     Partners: Male   Other Topics Concern    None   Social History Narrative    None     Social Determinants of Health     Financial Resource Strain: Low Risk     Difficulty of Paying Living Expenses: Not hard at all   Food Insecurity: No Food Insecurity    Worried About Running Out of Food in the Last Year: Never true    Ron of Food in the Last Year: Never true   Transportation Needs: No Transportation Needs    Lack of Transportation (Medical): No    Lack of Transportation (Non-Medical): No   Physical Activity:     Days of Exercise per Week:     Minutes of Exercise per Session:    Stress:     Feeling of Stress :    Social Connections:     Frequency of Communication with Friends and Family:     Frequency of Social Gatherings with Friends and Family:     Attends Yarsanism Services:     Active Member of Clubs or Organizations:     Attends Club or Organization Meetings:     Marital Status:    Intimate Partner Violence:     Fear of Current or Ex-Partner:     Emotionally Abused:     Physically Abused:     Sexually Abused:       Current Outpatient Medications   Medication Sig Dispense Refill    propranolol (INDERAL LA) 60 MG extended release capsule Take 1 capsule by mouth daily 90 capsule 3    SUMAtriptan (IMITREX) 100 MG tablet Take 1 tablet by mouth once as needed for Migraine 9 tablet 11    sertraline (ZOLOFT) 50 MG tablet Take 1 tablet by mouth daily 90 tablet 1     No current facility-administered medications for this visit. Objective:   Physical Exam  Vitals reviewed. Constitutional:       Appearance: Normal appearance. She is normal weight. HENT:      Head: Normocephalic and atraumatic. Cardiovascular:      Rate and Rhythm: Normal rate and regular rhythm. Pulmonary:      Effort: Pulmonary effort is normal.   Musculoskeletal:         General: Normal range of motion. Cervical back: Normal range of motion. Skin:     General: Skin is warm and dry. Neurological:      Mental Status: She is alert.    Psychiatric:         Behavior: Behavior normal.         Thought Content: Thought content normal.         Judgment: Judgment normal.         Assessment:      Diagnosis Orders   1. Encounter to discuss test results             Plan:    Reviewed patients pap and HPV results and discussed the results with the patient and per the ASCCP guidelines repeat pap per the recommendations. Return for 1 year for Annual and as needed. Patient was seen with total face to face time of  minutes. More than 50% of this visit was on counseling andeducation regarding the problems listed below and her options. She was also counseled on her preventative health maintenance recommendations and follow-up.     Electronically signed by: Eloisa Gambino CNP

## 2021-09-18 ENCOUNTER — HOSPITAL ENCOUNTER (EMERGENCY)
Age: 21
Discharge: HOME | End: 2021-09-18
Attending: EMERGENCY MEDICINE
Payer: COMMERCIAL

## 2021-09-18 ENCOUNTER — APPOINTMENT (OUTPATIENT)
Dept: CT IMAGING | Age: 21
End: 2021-09-18
Payer: COMMERCIAL

## 2021-09-18 VITALS
OXYGEN SATURATION: 100 % | DIASTOLIC BLOOD PRESSURE: 82 MMHG | WEIGHT: 170 LBS | BODY MASS INDEX: 30.12 KG/M2 | SYSTOLIC BLOOD PRESSURE: 144 MMHG | HEIGHT: 63 IN | RESPIRATION RATE: 16 BRPM | TEMPERATURE: 98.5 F | HEART RATE: 74 BPM

## 2021-09-18 DIAGNOSIS — S00.83XA CONTUSION OF FACE, INITIAL ENCOUNTER: Primary | ICD-10-CM

## 2021-09-18 PROCEDURE — 6370000000 HC RX 637 (ALT 250 FOR IP): Performed by: EMERGENCY MEDICINE

## 2021-09-18 PROCEDURE — 70450 CT HEAD/BRAIN W/O DYE: CPT

## 2021-09-18 PROCEDURE — 99283 EMERGENCY DEPT VISIT LOW MDM: CPT

## 2021-09-18 PROCEDURE — 70486 CT MAXILLOFACIAL W/O DYE: CPT

## 2021-09-18 RX ORDER — ACETAMINOPHEN 500 MG
1000 TABLET ORAL ONCE
Status: COMPLETED | OUTPATIENT
Start: 2021-09-18 | End: 2021-09-18

## 2021-09-18 RX ADMIN — ACETAMINOPHEN 1000 MG: 500 TABLET, FILM COATED ORAL at 12:22

## 2021-09-18 ASSESSMENT — ENCOUNTER SYMPTOMS
FACIAL SWELLING: 1
ABDOMINAL PAIN: 0
COUGH: 0
SHORTNESS OF BREATH: 0
EYE PAIN: 1
COLOR CHANGE: 0
NAUSEA: 0
BACK PAIN: 0
CONSTIPATION: 0
DIARRHEA: 0
SORE THROAT: 0
BLOOD IN STOOL: 0
TROUBLE SWALLOWING: 0
VOMITING: 0

## 2021-09-18 ASSESSMENT — PAIN SCALES - GENERAL: PAINLEVEL_OUTOF10: 4
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
## 2021-11-20 ENCOUNTER — HOSPITAL ENCOUNTER (EMERGENCY)
Age: 21
Discharge: HOME | End: 2021-11-20

## 2021-11-20 NOTE — ED NOTES
Pt brought back to room and educated on placement of splint on the left lower leg. Splint checked and in proper position. PMS intact. Cap refill less than 2 seconds. Pt unhappy with nurses explanation and decided to leave.        Eduardo Castaneda RN  11/20/21 4312

## 2021-11-22 ENCOUNTER — OFFICE VISIT (OUTPATIENT)
Dept: ORTHOPEDIC SURGERY | Age: 21
End: 2021-11-22
Payer: COMMERCIAL

## 2021-11-22 VITALS — WEIGHT: 170 LBS | HEIGHT: 63 IN | TEMPERATURE: 97.9 F | RESPIRATION RATE: 14 BRPM | BODY MASS INDEX: 30.12 KG/M2

## 2021-11-22 DIAGNOSIS — S92.355A CLOSED NONDISPLACED FRACTURE OF FIFTH METATARSAL BONE OF LEFT FOOT, INITIAL ENCOUNTER: Primary | ICD-10-CM

## 2021-11-22 DIAGNOSIS — M79.672 LEFT FOOT PAIN: Primary | ICD-10-CM

## 2021-11-22 PROCEDURE — G8427 DOCREV CUR MEDS BY ELIG CLIN: HCPCS | Performed by: ORTHOPAEDIC SURGERY

## 2021-11-22 PROCEDURE — G8484 FLU IMMUNIZE NO ADMIN: HCPCS | Performed by: ORTHOPAEDIC SURGERY

## 2021-11-22 PROCEDURE — 1036F TOBACCO NON-USER: CPT | Performed by: ORTHOPAEDIC SURGERY

## 2021-11-22 PROCEDURE — G8417 CALC BMI ABV UP PARAM F/U: HCPCS | Performed by: ORTHOPAEDIC SURGERY

## 2021-11-22 PROCEDURE — 99204 OFFICE O/P NEW MOD 45 MIN: CPT | Performed by: ORTHOPAEDIC SURGERY

## 2021-11-22 RX ORDER — ASPIRIN 325 MG
325 TABLET, DELAYED RELEASE (ENTERIC COATED) ORAL DAILY
Qty: 42 TABLET | Refills: 0 | Status: SHIPPED | OUTPATIENT
Start: 2021-11-22 | End: 2022-01-03

## 2021-11-22 NOTE — PROGRESS NOTES
[facility] [address] [facility]  [address]  [address]  Dept: [phone]    Ambulatory Orthopedic Consult      CHIEF COMPLAINT:    Chief Complaint   Patient presents with    Foot Pain     left       HISTORY OF PRESENT ILLNESS:      The patient is a 24 y.o. female who is being seen for evaluation of the above, which began 11/19/2021 secondary to a trip and fall from standing height  . At today's visit, she is using crutches. History is obtained today from:   [x]  the patient     [x]  EMR     []  one family member/friend    []  multiple family members/friends    []  other:          REVIEW OF SYSTEMS:  Musculoskeletal: See HPI for pertinent positives     Past Medical History:    She  has a past medical history of Allergic rhinitis, Ankle fracture (11/2/2012), Contact dermatitis (11/2/2012), and Obesity (11/2/2012). Past Surgical History:    She  has no past surgical history on file. Current Medications:     Current Outpatient Medications:     propranolol (INDERAL LA) 60 MG extended release capsule, Take 1 capsule by mouth daily, Disp: 90 capsule, Rfl: 3    SUMAtriptan (IMITREX) 100 MG tablet, Take 1 tablet by mouth once as needed for Migraine, Disp: 9 tablet, Rfl: 11    sertraline (ZOLOFT) 50 MG tablet, Take 1 tablet by mouth daily, Disp: 90 tablet, Rfl: 1     Allergies:    Patient has no known allergies. Family History:  family history includes Arthritis in her father; Asthma in her sister; Diabetes in her maternal grandmother; Early Death in her brother; Hearing Loss in her maternal grandfather, maternal grandmother, and another family member; Heart Attack in her mother; High Blood Pressure in her mother and sister; Mental Illness in her father; Other in her mother; Stroke in her maternal grandmother.     Social History:   Social History     Occupational History    Not on file   Tobacco Use    Smoking status: Passive Smoke Exposure - Never Smoker    Smokeless tobacco: Never Used    Tobacco comment: mom smokes outside   Substance and Sexual Activity    Alcohol use: No    Drug use: Yes     Types: Marijuana Tsang Cristhian)    Sexual activity: Yes     Partners: Male     Works at [employer] full-time    OBJECTIVE:  Temp 97.9 °F (36.6 °C)   Resp 14   Ht 5' 3\" (1.6 m)   Wt 170 lb (77.1 kg)   BMI 30.11 kg/m²    Psych: awake, alert  Cardio:  well perfused extremities, no cyanosis  Resp:  normal respiratory effort  Musculoskeletal:    Affected lower extremity:    Vascular: Limb well perfused, compartments soft/compressible. Skin: No erythema/ulcers. Intact. Neurovascular Status:  Grossly neurovascularly intact throughout  Motion:  Grossly able to fire major muscle groups with appropriate/expected AROM  Tenderness to Palpation: Base of the fifth metatarsal  -Able to cindy the foot      RADIOLOGY:   11/22/2021 FINDINGS:  Three views (AP, Mortise, and Lateral) of the bilateral ankle and three views (AP, Oblique, Lateral) of the bilateral foot were obtained in the office today and reviewed, revealing nondisplaced base of the fifth metatarsal fracture. IMPRESSION: Osseous injury as above. Electronically signed by Tera Cushing, MD      Relevant previous imaging reviewed, both imaging and report(s) as below:    No results found. ASSESSMENT AND PLAN:  Body mass index is 30.11 kg/m². She has a left nondisplaced base of the fifth metatarsal avulsion fracture, sustained on 11/19/2021. Notably, she has no relevant past medical history. We had a discussion today about the likely diagnosis and its natural history, physical exam and imaging findings, as well as various treatment options in detail. Surgically, we discussed a possible future surgery, depending on her clinical course, however, given the lack of displacement and the location of her fracture, I recommended conservative management at today's visit.     Orders/referrals were placed as below at today's visit. The patient will avoid pain provoking activity. The patient will avoid the routine use of NSAIDs to help prevent the risk of delayed healing. We discussed the risks of displacement. The patient was placed into a cam boot, and may weight-bear as tolerated. We also had a discussion about the risk of blood clot and thromboembolic events. The patient understands that there is an increased risk with surgery/immobilization, and understands nothing will completely eliminate the risk of DVT/PE's, and that any prophylactic medication does not substitute for early mobilization. Given her risk profile, I have recommended the following strategy to decrease the risk of blood clots, and the patient agrees and wishes to proceed:  Aspirin 325 mg PO q Day. -She was provided a note for work, recommending seated work x6 weeks. All questions were answered and the above plan was agreed upon. The patient will return to clinic in 1 month with repeat left foot x-rays. At her next visit, anticipate progressing her activity, possibly weaning her out of the cam boot, likely with the help of physical therapy. At the patient's next visit, depending on how the patient is doing and/or new imaging/labs results, we may consider the following options:    []  Lace up ankle     []  CAM boot         []  removable wrist brace     []  PT:        []  Wean out immobilization         []  Adv activity      []  Footmind        []  Spenco       []  Custom Orthotic:               []  AZ brace                    []  Rocker Bottom      []  Night splint    []  Heel cups        []  Strap        []  Toe gizmos    []  Topl        []  NSAIDs         []  Rina        []  Ref:         []  Stress Xray    []  CT        []  MRI  []  Inj:          []  Consider OR      []  Pick OR date    No follow-ups on file. No orders of the defined types were placed in this encounter.     No orders of the defined types were placed in this encounter. Lyubov Bernard MD  Orthopedic Surgery        Please excuse any typos/errors, as this note was created with the assistance of voice recognition software. While intending to generate a document that actually reflects the content of the visit, the document can still have some errors including those of syntax and sound-a-like substitutions which may escape proof reading. In such instances, actual meaning can be extrapolated by context.

## 2021-11-22 NOTE — LETTER
[address] [facility] [address]  [facility] [address] [address]  Phone: [phone]  Fax: [phone]           Lev Dee MD      November 22, 2021     Patient: Sunday Forman   MR Number: [id number]   YOB: 2000   Date of Visit: 11/22/2021       Dear Dr. Rowdy Carbajal:    Thank you for referring Sunday Forman to me for evaluation/treatment. Below are the relevant portions of my assessment and plan of care. She has a left nondisplaced base of the fifth metatarsal avulsion fracture, sustained on 11/19/2021. Notably, she has no relevant past medical history. We had a discussion today about the likely diagnosis and its natural history, physical exam and imaging findings, as well as various treatment options in detail. Surgically, we discussed a possible future surgery, depending on her clinical course, however, given the lack of displacement and the location of her fracture, I recommended conservative management at today's visit. Orders/referrals were placed as below at today's visit. The patient will avoid pain provoking activity. The patient will avoid the routine use of NSAIDs to help prevent the risk of delayed healing. We discussed the risks of displacement. The patient was placed into a cam boot, and may weight-bear as tolerated. We also had a discussion about the risk of blood clot and thromboembolic events. The patient understands that there is an increased risk with surgery/immobilization, and understands nothing will completely eliminate the risk of DVT/PE's, and that any prophylactic medication does not substitute for early mobilization. Given her risk profile, I have recommended the following strategy to decrease the risk of blood clots, and the patient agrees and wishes to proceed:  Aspirin 325 mg PO q Day. -She was provided a note for work, recommending seated work x6 weeks.     All questions were answered and the above plan was agreed upon. The patient will return to clinic in 1 month with repeat left foot x-rays. At her next visit, anticipate progressing her activity, possibly weaning her out of the cam boot, likely with the help of physical therapy. If you have questions, please do not hesitate to call me. I look forward to following Ksenia along with you.     Sincerely,        Dee Carter MD    CC providers:  ANI Wadsworth - CNP  [address] [place] [address]  [address] [address]  [address] [facility] [address]

## 2021-12-15 DIAGNOSIS — S92.355A CLOSED NONDISPLACED FRACTURE OF FIFTH METATARSAL BONE OF LEFT FOOT, INITIAL ENCOUNTER: Primary | ICD-10-CM

## 2021-12-20 ENCOUNTER — OFFICE VISIT (OUTPATIENT)
Dept: ORTHOPEDIC SURGERY | Age: 21
End: 2021-12-20
Payer: COMMERCIAL

## 2021-12-20 VITALS — RESPIRATION RATE: 12 BRPM | HEIGHT: 63 IN | WEIGHT: 170 LBS | BODY MASS INDEX: 30.12 KG/M2

## 2021-12-20 DIAGNOSIS — S92.355D CLOSED NONDISPLACED FRACTURE OF FIFTH METATARSAL BONE OF LEFT FOOT WITH ROUTINE HEALING, SUBSEQUENT ENCOUNTER: Primary | ICD-10-CM

## 2021-12-20 PROCEDURE — 1036F TOBACCO NON-USER: CPT | Performed by: ORTHOPAEDIC SURGERY

## 2021-12-20 PROCEDURE — G8427 DOCREV CUR MEDS BY ELIG CLIN: HCPCS | Performed by: ORTHOPAEDIC SURGERY

## 2021-12-20 PROCEDURE — G8417 CALC BMI ABV UP PARAM F/U: HCPCS | Performed by: ORTHOPAEDIC SURGERY

## 2021-12-20 PROCEDURE — 99213 OFFICE O/P EST LOW 20 MIN: CPT | Performed by: ORTHOPAEDIC SURGERY

## 2021-12-20 PROCEDURE — G8484 FLU IMMUNIZE NO ADMIN: HCPCS | Performed by: ORTHOPAEDIC SURGERY

## 2021-12-20 NOTE — PROGRESS NOTES
[facility] [address] [facility]  [address]  [address]  Dept: [phone]    Ambulatory Orthopedic Consult      CHIEF COMPLAINT:    Chief Complaint   Patient presents with    Foot Pain     left       HISTORY OF PRESENT ILLNESS:      The patient is a 24 y.o. female who is being seen for evaluation of the above, which began 11/19/2021 secondary to a trip and fall from standing height  . At today's visit, she is using crutches. History is obtained today from:   [x]  the patient     [x]  EMR     []  one family member/friend    []  multiple family members/friends    []  other:      INTERVAL HISTORY 12/20/2021:  She is seen again today in the office for follow up of a previous issue (as above). Since being seen last, the patient is doing better. At today's visit, she is using a removable brace. History is obtained today from:   [x]  the patient     []  EMR     []  one family member/friend    []  multiple family members/friends    []  other:      She reports that she has been walking without the brace, and reports mild soreness with doing so. REVIEW OF SYSTEMS:  Musculoskeletal: See HPI for pertinent positives     Past Medical History:    She  has a past medical history of Allergic rhinitis, Ankle fracture (11/2/2012), Contact dermatitis (11/2/2012), and Obesity (11/2/2012). Past Surgical History:    She  has no past surgical history on file. Current Medications:     Current Outpatient Medications:     aspirin 325 MG EC tablet, Take 1 tablet by mouth daily, Disp: 42 tablet, Rfl: 0    propranolol (INDERAL LA) 60 MG extended release capsule, Take 1 capsule by mouth daily, Disp: 90 capsule, Rfl: 3    SUMAtriptan (IMITREX) 100 MG tablet, Take 1 tablet by mouth once as needed for Migraine, Disp: 9 tablet, Rfl: 11    sertraline (ZOLOFT) 50 MG tablet, Take 1 tablet by mouth daily, Disp: 90 tablet, Rfl: 1     Allergies:    Patient has no known allergies.     Family History:  family history includes Arthritis in her father; Asthma in her sister; Diabetes in her maternal grandmother; Early Death in her brother; Hearing Loss in her maternal grandfather, maternal grandmother, and another family member; Heart Attack in her mother; High Blood Pressure in her mother and sister; Mental Illness in her father; Other in her mother; Stroke in her maternal grandmother. Social History:   Social History     Occupational History    Not on file   Tobacco Use    Smoking status: Passive Smoke Exposure - Never Smoker    Smokeless tobacco: Never Used    Tobacco comment: mom smokes outside   Substance and Sexual Activity    Alcohol use: No    Drug use: Yes     Types: Marijuana (Kailyn Omid)    Sexual activity: Yes     Partners: Male     Works at [employer] full-time    OBJECTIVE:  Resp 12   Ht 5' 3\" (1.6 m)   Wt 170 lb (77.1 kg)   BMI 30.11 kg/m²    Psych: awake, alert  Cardio:  well perfused extremities, no cyanosis  Resp:  normal respiratory effort  Musculoskeletal:    Affected lower extremity:    Vascular: Limb well perfused, compartments soft/compressible. Skin: No erythema/ulcers. Intact. Neurovascular Status:  Grossly neurovascularly intact throughout  Motion:  Grossly able to fire major muscle groups with appropriate/expected AROM  Tenderness to Palpation: Base of the fifth metatarsal--improved/minimal  -Able to cindy the foot      RADIOLOGY:   12/20/2021 FINDINGS:  Three views (AP, Oblique, Lateral) of the bilateral foot were obtained in the office today and reviewed, revealing nondisplaced base of the fifth metatarsal fracture. Interval healing without interval displacement. IMPRESSION: Osseous injury as above. Electronically signed by Rodrick Catherine MD      Relevant previous imaging reviewed, both imaging and report(s) as below:    No results found. ASSESSMENT AND PLAN:  Body mass index is 30.11 kg/m².        She has a left nondisplaced base of the fifth metatarsal avulsion fracture, sustained on 11/19/2021. She is doing well with conservative management. Notably, she has no relevant past medical history. We had a discussion today about the likely diagnosis and its natural history, physical exam and imaging findings, as well as various treatment options in detail. Surgically, we discussed a possible future surgery, depending on her clinical course, however, I have recommended continuing conservative management at this time, as she is healing well without surgery. We discussed the risks of displacement, reinjury, and delayed healing/nonunion. Orders/referrals were placed as below at today's visit. She may continue to advance her activity as tolerated. She may continue weight bearing as tolerated. The patient may wean out of the cam boot, and into a lace up ankle brace, which was provided today. The patient was referred to physical therapy to help with this transition, and to help progress the patient's activity, along with general strengthening, gait training, and range of motion. Once the patient is completely out of the cam boot, the patient may stop taking DVT prophylaxis as previously prescribed.     -She was previously provided a note for work, recommending seated work x6 weeks. All questions were answered and the above plan was agreed upon. The patient will return to clinic in 3 months as needed with repeat left foot x-rays, or sooner as needed.            At the patient's next visit, depending on how the patient is doing and/or new imaging/labs results, we may consider the following options:    []  Lace up ankle     []  CAM boot         []  removable wrist brace     []  PT:        []  Wean out immobilization         []  Adv activity      []  Footmind        []  Spenco       []  Custom Orthotic:               []  AZ brace                    []  Rocker Bottom      []  Night splint    []  Heel cups        []  Strap        []  Toe gizmos    []  Topl []  NSAIDs         []  Rina        []  Ref:         []  Stress Xray    []  CT        []  MRI  []  Inj:          []  Consider OR      []  Pick OR date    No follow-ups on file. No orders of the defined types were placed in this encounter. No orders of the defined types were placed in this encounter. Mandeep Roe MD  Orthopedic Surgery        Please excuse any typos/errors, as this note was created with the assistance of voice recognition software. While intending to generate a document that actually reflects the content of the visit, the document can still have some errors including those of syntax and sound-a-like substitutions which may escape proof reading. In such instances, actual meaning can be extrapolated by context.

## 2022-05-10 ENCOUNTER — HOSPITAL ENCOUNTER (OUTPATIENT)
Age: 22
Setting detail: SPECIMEN
Discharge: HOME | End: 2022-05-10

## 2022-05-10 DIAGNOSIS — R82.90 ABNORMAL URINE ODOR: ICD-10-CM

## 2022-05-10 DIAGNOSIS — R31.9 HEMATURIA, UNSPECIFIED TYPE: ICD-10-CM

## 2022-05-10 DIAGNOSIS — M54.50 ACUTE LOW BACK PAIN WITHOUT SCIATICA, UNSPECIFIED BACK PAIN LATERALITY: ICD-10-CM

## 2022-05-10 DIAGNOSIS — R35.0 URINARY FREQUENCY: ICD-10-CM

## 2022-05-11 LAB
CULTURE: NORMAL
SPECIMEN DESCRIPTION: NORMAL

## 2023-01-02 ENCOUNTER — APPOINTMENT (OUTPATIENT)
Dept: CT IMAGING | Age: 23
End: 2023-01-02
Payer: COMMERCIAL

## 2023-01-02 ENCOUNTER — HOSPITAL ENCOUNTER (EMERGENCY)
Age: 23
Discharge: HOME | End: 2023-01-02
Attending: EMERGENCY MEDICINE
Payer: COMMERCIAL

## 2023-01-02 VITALS
WEIGHT: 160 LBS | SYSTOLIC BLOOD PRESSURE: 124 MMHG | HEART RATE: 68 BPM | BODY MASS INDEX: 28.35 KG/M2 | HEIGHT: 63 IN | TEMPERATURE: 97.9 F | OXYGEN SATURATION: 98 % | RESPIRATION RATE: 16 BRPM | DIASTOLIC BLOOD PRESSURE: 76 MMHG

## 2023-01-02 DIAGNOSIS — N20.0 NEPHROLITHIASIS: Primary | ICD-10-CM

## 2023-01-02 LAB
ABSOLUTE EOS #: 0.1 K/UL (ref 0–0.4)
ABSOLUTE LYMPH #: 1.7 K/UL (ref 1–4.8)
ABSOLUTE MONO #: 0.6 K/UL (ref 0.1–1.3)
ALBUMIN SERPL-MCNC: 4.4 G/DL (ref 3.5–5.2)
ALP BLD-CCNC: 65 U/L (ref 35–104)
ALT SERPL-CCNC: 7 U/L (ref 5–33)
ANION GAP SERPL CALCULATED.3IONS-SCNC: 9 MMOL/L (ref 9–17)
AST SERPL-CCNC: 13 U/L
BACTERIA: ABNORMAL
BASOPHILS # BLD: 1 % (ref 0–2)
BASOPHILS ABSOLUTE: 0 K/UL (ref 0–0.2)
BILIRUB SERPL-MCNC: 0.3 MG/DL (ref 0.3–1.2)
BILIRUBIN URINE: NEGATIVE
BUN BLDV-MCNC: 9 MG/DL (ref 6–20)
CALCIUM SERPL-MCNC: 8.8 MG/DL (ref 8.6–10.4)
CANDIDA SPECIES, DNA PROBE: NEGATIVE
CASTS UA: ABNORMAL /LPF
CHLORIDE BLD-SCNC: 104 MMOL/L (ref 98–107)
CO2: 25 MMOL/L (ref 20–31)
COLOR: YELLOW
CREAT SERPL-MCNC: 0.73 MG/DL (ref 0.5–0.9)
EOSINOPHILS RELATIVE PERCENT: 1 % (ref 0–4)
EPITHELIAL CELLS UA: ABNORMAL /HPF
GARDNERELLA VAGINALIS, DNA PROBE: POSITIVE
GFR SERPL CREATININE-BSD FRML MDRD: >60 ML/MIN/1.73M2
GLUCOSE BLD-MCNC: 106 MG/DL (ref 70–99)
GLUCOSE URINE: NEGATIVE
HCG(URINE) PREGNANCY TEST: NEGATIVE
HCT VFR BLD CALC: 37.2 % (ref 36–46)
HEMOGLOBIN: 12.4 G/DL (ref 12–16)
INFLUENZA A: NOT DETECTED
INFLUENZA B: NOT DETECTED
KETONES, URINE: ABNORMAL
LEUKOCYTE ESTERASE, URINE: ABNORMAL
LIPASE: 19 U/L (ref 13–60)
LYMPHOCYTES # BLD: 28 % (ref 24–44)
MCH RBC QN AUTO: 31.8 PG (ref 26–34)
MCHC RBC AUTO-ENTMCNC: 33.3 G/DL (ref 31–37)
MCV RBC AUTO: 95.4 FL (ref 80–100)
MONOCYTES # BLD: 9 % (ref 1–7)
NITRITE, URINE: NEGATIVE
PDW BLD-RTO: 13.3 % (ref 11.5–14.9)
PH UA: 6.5 (ref 5–8)
PLATELET # BLD: 234 K/UL (ref 150–450)
PMV BLD AUTO: 8.9 FL (ref 6–12)
POTASSIUM SERPL-SCNC: 3.6 MMOL/L (ref 3.7–5.3)
PROTEIN UA: ABNORMAL
RBC # BLD: 3.9 M/UL (ref 4–5.2)
RBC UA: ABNORMAL /HPF
SARS-COV-2 RNA, RT PCR: NOT DETECTED
SEG NEUTROPHILS: 61 % (ref 36–66)
SEGMENTED NEUTROPHILS ABSOLUTE COUNT: 4 K/UL (ref 1.3–9.1)
SODIUM BLD-SCNC: 138 MMOL/L (ref 135–144)
SOURCE: ABNORMAL
SOURCE: NORMAL
SPECIFIC GRAVITY UA: 1.02 (ref 1–1.03)
SPECIMEN DESCRIPTION: NORMAL
TOTAL PROTEIN: 7 G/DL (ref 6.4–8.3)
TRICHOMONAS VAGINALIS DNA: NEGATIVE
TURBIDITY: ABNORMAL
URINE HGB: ABNORMAL
UROBILINOGEN, URINE: NORMAL
WBC # BLD: 6.4 K/UL (ref 3.5–11)
WBC UA: ABNORMAL /HPF

## 2023-01-02 PROCEDURE — 87660 TRICHOMONAS VAGIN DIR PROBE: CPT

## 2023-01-02 PROCEDURE — 87491 CHLMYD TRACH DNA AMP PROBE: CPT

## 2023-01-02 PROCEDURE — 74176 CT ABD & PELVIS W/O CONTRAST: CPT

## 2023-01-02 PROCEDURE — 36415 COLL VENOUS BLD VENIPUNCTURE: CPT

## 2023-01-02 PROCEDURE — 85025 COMPLETE CBC W/AUTO DIFF WBC: CPT

## 2023-01-02 PROCEDURE — 83690 ASSAY OF LIPASE: CPT

## 2023-01-02 PROCEDURE — 87480 CANDIDA DNA DIR PROBE: CPT

## 2023-01-02 PROCEDURE — 80053 COMPREHEN METABOLIC PANEL: CPT

## 2023-01-02 PROCEDURE — 87636 SARSCOV2 & INF A&B AMP PRB: CPT

## 2023-01-02 PROCEDURE — 81001 URINALYSIS AUTO W/SCOPE: CPT

## 2023-01-02 PROCEDURE — 81025 URINE PREGNANCY TEST: CPT

## 2023-01-02 PROCEDURE — 99284 EMERGENCY DEPT VISIT MOD MDM: CPT

## 2023-01-02 PROCEDURE — 87510 GARDNER VAG DNA DIR PROBE: CPT

## 2023-01-02 PROCEDURE — 87591 N.GONORRHOEAE DNA AMP PROB: CPT

## 2023-01-02 RX ORDER — TAMSULOSIN HYDROCHLORIDE 0.4 MG/1
0.4 CAPSULE ORAL DAILY
Qty: 30 CAPSULE | Refills: 0 | Status: SHIPPED | OUTPATIENT
Start: 2023-01-02

## 2023-01-02 RX ORDER — IBUPROFEN 800 MG/1
800 TABLET ORAL EVERY 6 HOURS PRN
Qty: 21 TABLET | Refills: 0 | Status: SHIPPED | OUTPATIENT
Start: 2023-01-02 | End: 2023-01-12

## 2023-01-02 RX ORDER — HYDROCODONE BITARTRATE AND ACETAMINOPHEN 5; 325 MG/1; MG/1
1 TABLET ORAL EVERY 8 HOURS PRN
Qty: 9 TABLET | Refills: 0 | Status: SHIPPED | OUTPATIENT
Start: 2023-01-02 | End: 2023-01-05

## 2023-01-02 ASSESSMENT — PAIN - FUNCTIONAL ASSESSMENT: PAIN_FUNCTIONAL_ASSESSMENT: 0-10

## 2023-01-02 ASSESSMENT — PAIN SCALES - GENERAL: PAINLEVEL_OUTOF10: 8

## 2023-01-02 NOTE — ED PROVIDER NOTES
[address]  eMERGENCY dEPARTMENT eNCOUnter  Resident    Pt Name: Tray Hansen  MRN: [id number]  [place] 2000  Date of evaluation: 1/2/2023  PCP:  Minnette Kayser, APRN - CNP    CHIEF COMPLAINT       Chief Complaint   Patient presents with    Flank Pain     Rt flank pain    Groin Pain     Rt groin pain         HISTORY OF PRESENT ILLNESS    Tray Hansen is a 20 y.o. female who presents with sudden onset right-sided flank pain since yesterday that radiates forward to her groin. Patient denies any dysuria, is unsure of any hematuria as she is on her period. Has had some nausea, no vomiting. Denies any fevers or chills. Did have some constipation but then started having diarrhea yesterday. Location/Symptom: Right-sided flank pain  Timing/Onset: Sudden onset yesterday  Context/Setting: N/A  Quality: Sharp  Duration: 1 day  Modifying Factors: N/A  Severity: 9/10, radiates to the groin    REVIEW OF SYSTEMS       Review of Systems   Constitutional:  Negative for activity change, appetite change, chills and fever. Respiratory:  Negative for cough and shortness of breath. Cardiovascular:  Negative for chest pain. Gastrointestinal:  Positive for abdominal pain, diarrhea and nausea. Negative for blood in stool, constipation and vomiting. Genitourinary:  Positive for flank pain. Negative for difficulty urinating, dysuria, hematuria, vaginal bleeding and vaginal discharge. Musculoskeletal:  Negative for back pain and neck pain. Neurological:  Negative for dizziness, syncope, weakness and headaches. PAST MEDICAL HISTORY    has a past medical history of Allergic rhinitis, Ankle fracture, Contact dermatitis, and Obesity. SURGICAL HISTORY      has no past surgical history on file.       CURRENT MEDICATIONS       Discharge Medication List as of 1/2/2023  7:33 PM        CONTINUE these medications which have NOT CHANGED    Details   aspirin 325 MG EC tablet Take 1 tablet by mouth daily, Disp-42 tablet, R-0Normal      propranolol (INDERAL LA) 60 MG extended release capsule Take 1 capsule by mouth daily, Disp-90 capsule, R-3Normal      SUMAtriptan (IMITREX) 100 MG tablet Take 1 tablet by mouth once as needed for Migraine, Disp-9 tablet, R-11Normal      sertraline (ZOLOFT) 50 MG tablet Take 1 tablet by mouth daily, Disp-90 tablet, R-1Normal             ALLERGIES     has No Known Allergies. FAMILY HISTORY     She indicated that her mother is alive. She indicated that her father is alive. She indicated that her sister is alive. She indicated that her brother is . She indicated that her maternal grandmother is . She indicated that her maternal grandfather is alive. She indicated that her paternal grandfather is . She indicated that her maternal aunt is alive. She indicated that the status of her other is unknown.     family history includes Arthritis in her father; Asthma in her sister; Diabetes in her maternal grandmother; Early Death in her brother; Hearing Loss in her maternal grandfather, maternal grandmother, and another family member; Heart Attack in her mother; High Blood Pressure in her mother and sister; Mental Illness in her father; Other in her mother; Stroke in her maternal grandmother. SOCIAL HISTORY      reports that she is a non-smoker but has been exposed to tobacco smoke. She has never used smokeless tobacco. She reports current drug use. Drug: Marijuana [employer]). She reports that she does not drink alcohol. PHYSICAL EXAM     INITIAL VITALS:  height is 5' 3.25\" (1.607 m) and weight is 160 lb (72.6 kg). Her oral temperature is 97.9 °F (36.6 °C). Her blood pressure is 124/76 and her pulse is 68. Her respiration is 16 and oxygen saturation is 98%. Physical Exam  Constitutional:       Appearance: Normal appearance. HENT:      Head: Normocephalic and atraumatic.       Right Ear: External ear normal.      Left Ear: External ear normal.   Eyes: Extraocular Movements: Extraocular movements intact. Cardiovascular:      Rate and Rhythm: Normal rate. Pulses: Normal pulses. Pulmonary:      Effort: Pulmonary effort is normal.      Breath sounds: Normal breath sounds. Abdominal:      Palpations: Abdomen is soft. Tenderness: There is no abdominal tenderness. There is right CVA tenderness. Musculoskeletal:         General: Normal range of motion. Cervical back: Normal range of motion. Neurological:      General: No focal deficit present. Mental Status: She is alert and oriented to person, place, and time. Psychiatric:         Mood and Affect: Mood normal.       DIFFERENTIAL DIAGNOSIS/MDM:   28-year-old female who presents with sudden onset right-sided flank pain that started yesterday. She is unsure of hematuria but denies dysuria. Will obtain belly labs, obtain CT abdomen pelvis without to evaluate for obstructing stone. Urine is nonconcerning for infection, blood is possibly due to. Versus nephrolithiasis. We will also obtain vaginitis and GC swabs as well as rapid COVID and rapid flu. ED Course as of 01/03/23 0139   Mon Jan 02, 2023   1905 R sided kidney stone seen on CT   [SS]   1923 IMPRESSION:  There is a 3 mm stone in the mid right ureter with mild to moderate upstream  obstructive uropathy. There are a few punctate foci of gas within the urinary bladder without other  abnormality identified. Clinical and laboratory correlation suggested to  exclude infectious etiology. This result has not been signed. Information might be incomplete. [SS]      ED Course User Index  [SS] Saeed Garcia MD        CT scan shows 3 mm stone, nonobstructing. We will start patient on Flomax.       DIAGNOSTIC RESULTS     EKG: All EKG's are interpreted by the Emergency Department Physician who either signs or Co-signs this chart in the absence of a cardiologist.        RADIOLOGY:   I directly visualized the following  images and reviewed the radiologist interpretations:  CT ABDOMEN PELVIS WO CONTRAST Additional Contrast? None   Final Result   There is a 3 mm stone in the mid right ureter with mild to moderate upstream   obstructive uropathy. There are a few punctate foci of gas within the urinary bladder without other   abnormality identified. Clinical and laboratory correlation suggested to   exclude infectious etiology. ED BEDSIDE ULTRASOUND:   None     LABS:  Labs Reviewed   VAGINITIS DNA PROBE - Abnormal; Notable for the following components:       Result Value    Gardnerella Vaginalis, DNA Probe POSITIVE (*)     All other components within normal limits   URINALYSIS WITH REFLEX TO CULTURE - Abnormal; Notable for the following components:    Turbidity UA Cloudy (*)     Ketones, Urine TRACE (*)     Urine Hgb LARGE (*)     Protein, UA 2+ (*)     Leukocyte Esterase, Urine SMALL (*)     All other components within normal limits   MICROSCOPIC URINALYSIS - Abnormal; Notable for the following components:    Bacteria, UA FEW (*)     All other components within normal limits   COMPREHENSIVE METABOLIC PANEL - Abnormal; Notable for the following components:    Glucose 106 (*)     Potassium 3.6 (*)     All other components within normal limits   CBC WITH AUTO DIFFERENTIAL - Abnormal; Notable for the following components:    RBC 3.90 (*)     Monocytes 9 (*)     All other components within normal limits   COVID-19 & INFLUENZA COMBO   C.TRACHOMATIS N.GONORRHOEAE DNA   PREGNANCY, URINE   LIPASE       Patient's urine likely contaminated, no elevated white count.   No elevated creatinine and BUN      EMERGENCY DEPARTMENT COURSE:   Vitals:    Vitals:    01/02/23 1800   BP: 124/76   Pulse: 68   Resp: 16   Temp: 97.9 °F (36.6 °C)   TempSrc: Oral   SpO2: 98%   Weight: 160 lb (72.6 kg)   Height: 5' 3.25\" (1.607 m)     Vital signs are normal    CRITICAL CARE:  There was significant risk of life threatening deterioration of patient's condition requiring my direct management. Critical care time 0 minutes, excluding any documented procedures. CONSULTS:  None      PROCEDURES:  None    FINAL IMPRESSION      1. Nephrolithiasis            DISPOSITION/PLAN   DISPOSITION Decision To Discharge 01/02/2023 07:31:30 PM      PATIENT REFERRED TO:  ANI Henning - CNP  [facility]  [address] [place] [address],[address]  [address]          Mario Long MD  [address] [address] [phone]          DISCHARGE MEDICATIONS:  Discharge Medication List as of 1/2/2023  7:33 PM        START taking these medications    Details   tamsulosin (FLOMAX) 0.4 MG capsule Take 1 capsule by mouth daily, Disp-30 capsule, R-0Normal      HYDROcodone-acetaminophen (NORCO) 5-325 MG per tablet Take 1 tablet by mouth every 8 hours as needed for Pain for up to 3 days. Intended supply: 3 days.  Take lowest dose possible to manage pain Max Daily Amount: 3 tablets, Disp-9 tablet, R-0Print      ibuprofen (IBU) 800 MG tablet Take 1 tablet by mouth every 6 hours as needed for Pain, Disp-21 tablet, R-0Normal             (Please note that portions of this note were completed with a voice recognition program.  Efforts were made to edit the dictations but occasionally words are mis-transcribed.)    Deni Ge MD  Emergency Medicine Resident        Deni Ge MD  Resident  01/03/23 7409

## 2023-01-03 LAB
C TRACH DNA GENITAL QL NAA+PROBE: NEGATIVE
N. GONORRHOEAE DNA: NEGATIVE
SPECIMEN DESCRIPTION: NORMAL

## 2023-01-03 ASSESSMENT — ENCOUNTER SYMPTOMS
BLOOD IN STOOL: 0
ABDOMINAL PAIN: 1
DIARRHEA: 1
SHORTNESS OF BREATH: 0
VOMITING: 0
BACK PAIN: 0
COUGH: 0
CONSTIPATION: 0
NAUSEA: 1

## 2023-01-03 NOTE — DISCHARGE INSTRUCTIONS
Follow up with your urologist or in the Urology Clinic - call for an appointment. For pain use acetaminophen (Tylenol) or ibuprofen (Motrin / Advil), unless prescribed medications that have acetaminophen or ibuprofen (or similar medications) in it. You can take over the counter acetaminophen tablets (1 - 2 tablets of the 500-mg strength every 6 hours) or ibuprofen tablets (2 tablets every 4 hours). Drink plenty of water. Strain your urine for any stones (collect the stones and take them with you to the urologist    [address] for worsening symptoms, inability to urinate, worsening of blood in your urine, or if you develop any concerning symptoms such as: high fever not relieved by acetaminophen (Tylenol) and/or ibuprofen (Motrin / Advil), chills, shortness of breath, chest pain, feeling of your heart fluttering or racing, persistent nausea and/or vomiting, vomiting up blood, blood in your stool, numbness, loss of consciousness, weakness or tingling in the arms or legs or change in color of the extremities, changes in mental status, persistent headache, blurry vision, loss of bladder / bowel control, unable to follow up with your physician, or other any other care or concern.

## 2023-01-03 NOTE — ED PROVIDER NOTES
[address] ED  eMERGENCY dEPARTMENT eNCOUnter   Attending Attestation     Pt Name: Adriana Lopez  MRN: [id number]  [place] 2000  Date of evaluation: 1/2/23       Adriana Lopez is a 20 y.o. female who presents with Flank Pain (Rt flank pain) and Groin Pain (Rt groin pain)      History:   Started having right flank pain yesterday thought it was just menstrual cramps. No hematuria but patient has current vaginal bleeding from her menses. Was having a lot of nausea and vomiting last night. No diarrhea no fevers. Exam: Vitals:   Vitals:    01/02/23 1800   BP: 124/76   Pulse: 68   Resp: 16   Temp: 97.9 °F (36.6 °C)   TempSrc: Oral   SpO2: 98%   Weight: 160 lb (72.6 kg)   Height: 5' 3.25\" (1.607 m)     Abdomen soft nontender nondistended. Patient does have right-sided flank pain. I performed a history and physical examination of the patient and discussed management with the resident. I reviewed the residents note and agree with the documented findings and plan of care. Any areas of disagreement are noted on the chart. I was personally present for the key portions of any procedures. I have documented in the chart those procedures where I was not present during the key portions. I have personally reviewed all images and agree with the resident's interpretation. I have reviewed the emergency nurses triage note. I agree with the chief complaint, past medical history, past surgical history, allergies, medications, social and family history as documented unless otherwise noted below. Documentation of the HPI, Physical Exam and Medical Decision Making performed by medical students or scribes is based on my personal performance of the HPI, PE and MDM. I personally evaluated and examined the patient in conjunction with the APC and agree with the assessment, treatment plan, and disposition of the patient as recorded by the APC. Additional findings are as noted.     Jerome Rankin MD  Attending Emergency Physician             Ruel Zendejas MD  01/02/23 3578

## 2023-01-06 ENCOUNTER — APPOINTMENT (OUTPATIENT)
Dept: GENERAL RADIOLOGY | Age: 23
DRG: 465 | End: 2023-01-06
Payer: COMMERCIAL

## 2023-01-06 ENCOUNTER — HOSPITAL ENCOUNTER (INPATIENT)
Age: 23
LOS: 1 days | Discharge: HOME | DRG: 465 | End: 2023-01-06
Attending: EMERGENCY MEDICINE | Admitting: STUDENT IN AN ORGANIZED HEALTH CARE EDUCATION/TRAINING PROGRAM
Payer: COMMERCIAL

## 2023-01-06 VITALS
HEART RATE: 59 BPM | RESPIRATION RATE: 17 BRPM | WEIGHT: 160 LBS | SYSTOLIC BLOOD PRESSURE: 140 MMHG | BODY MASS INDEX: 28.35 KG/M2 | TEMPERATURE: 97.7 F | DIASTOLIC BLOOD PRESSURE: 87 MMHG | OXYGEN SATURATION: 100 % | HEIGHT: 63 IN

## 2023-01-06 DIAGNOSIS — N20.0 KIDNEY STONE: Primary | ICD-10-CM

## 2023-01-06 DIAGNOSIS — R11.10 INTRACTABLE VOMITING: ICD-10-CM

## 2023-01-06 DIAGNOSIS — R52 INTRACTABLE PAIN: ICD-10-CM

## 2023-01-06 LAB
ABSOLUTE EOS #: 0 K/UL (ref 0–0.4)
ABSOLUTE LYMPH #: 1.3 K/UL (ref 1–4.8)
ABSOLUTE MONO #: 0.6 K/UL (ref 0.1–1.3)
ALBUMIN SERPL-MCNC: 4.4 G/DL (ref 3.5–5.2)
ALP BLD-CCNC: 58 U/L (ref 35–104)
ALT SERPL-CCNC: 8 U/L (ref 5–33)
ANION GAP SERPL CALCULATED.3IONS-SCNC: 10 MMOL/L (ref 9–17)
AST SERPL-CCNC: 15 U/L
BACTERIA: NORMAL
BASOPHILS # BLD: 1 % (ref 0–2)
BASOPHILS ABSOLUTE: 0 K/UL (ref 0–0.2)
BILIRUB SERPL-MCNC: 0.4 MG/DL (ref 0.3–1.2)
BILIRUBIN URINE: NEGATIVE
BUN BLDV-MCNC: 7 MG/DL (ref 6–20)
CALCIUM SERPL-MCNC: 9.2 MG/DL (ref 8.6–10.4)
CASTS UA: NORMAL /LPF
CHLORIDE BLD-SCNC: 103 MMOL/L (ref 98–107)
CO2: 24 MMOL/L (ref 20–31)
COLOR: YELLOW
CREAT SERPL-MCNC: 0.77 MG/DL (ref 0.5–0.9)
EOSINOPHILS RELATIVE PERCENT: 1 % (ref 0–4)
EPITHELIAL CELLS UA: NORMAL /HPF
GFR SERPL CREATININE-BSD FRML MDRD: >60 ML/MIN/1.73M2
GLUCOSE BLD-MCNC: 97 MG/DL (ref 70–99)
GLUCOSE URINE: NEGATIVE
HCT VFR BLD CALC: 36.6 % (ref 36–46)
HEMOGLOBIN: 12.3 G/DL (ref 12–16)
KETONES, URINE: NEGATIVE
LEUKOCYTE ESTERASE, URINE: NEGATIVE
LYMPHOCYTES # BLD: 17 % (ref 24–44)
MCH RBC QN AUTO: 32.2 PG (ref 26–34)
MCHC RBC AUTO-ENTMCNC: 33.6 G/DL (ref 31–37)
MCV RBC AUTO: 96 FL (ref 80–100)
MONOCYTES # BLD: 8 % (ref 1–7)
NITRITE, URINE: NEGATIVE
PDW BLD-RTO: 13.2 % (ref 11.5–14.9)
PH UA: 8.5 (ref 5–8)
PLATELET # BLD: 229 K/UL (ref 150–450)
PMV BLD AUTO: 8.7 FL (ref 6–12)
POTASSIUM SERPL-SCNC: 4 MMOL/L (ref 3.7–5.3)
PROTEIN UA: NEGATIVE
RBC # BLD: 3.81 M/UL (ref 4–5.2)
RBC UA: NORMAL /HPF
SEG NEUTROPHILS: 73 % (ref 36–66)
SEGMENTED NEUTROPHILS ABSOLUTE COUNT: 5.8 K/UL (ref 1.3–9.1)
SODIUM BLD-SCNC: 137 MMOL/L (ref 135–144)
SPECIFIC GRAVITY UA: 1.01 (ref 1–1.03)
TOTAL PROTEIN: 6.8 G/DL (ref 6.4–8.3)
TURBIDITY: CLEAR
URINE HGB: ABNORMAL
UROBILINOGEN, URINE: NORMAL
WBC # BLD: 7.8 K/UL (ref 3.5–11)
WBC UA: NORMAL /HPF

## 2023-01-06 PROCEDURE — 80053 COMPREHEN METABOLIC PANEL: CPT

## 2023-01-06 PROCEDURE — 82365 CALCULUS SPECTROSCOPY: CPT

## 2023-01-06 PROCEDURE — 81001 URINALYSIS AUTO W/SCOPE: CPT

## 2023-01-06 PROCEDURE — 99222 1ST HOSP IP/OBS MODERATE 55: CPT | Performed by: FAMILY MEDICINE

## 2023-01-06 PROCEDURE — 99285 EMERGENCY DEPT VISIT HI MDM: CPT

## 2023-01-06 PROCEDURE — 96375 TX/PRO/DX INJ NEW DRUG ADDON: CPT

## 2023-01-06 PROCEDURE — 6360000002 HC RX W HCPCS: Performed by: EMERGENCY MEDICINE

## 2023-01-06 PROCEDURE — 2580000003 HC RX 258: Performed by: STUDENT IN AN ORGANIZED HEALTH CARE EDUCATION/TRAINING PROGRAM

## 2023-01-06 PROCEDURE — 74018 RADEX ABDOMEN 1 VIEW: CPT

## 2023-01-06 PROCEDURE — 96374 THER/PROPH/DIAG INJ IV PUSH: CPT

## 2023-01-06 PROCEDURE — 1200000000 HC SEMI PRIVATE

## 2023-01-06 PROCEDURE — 87086 URINE CULTURE/COLONY COUNT: CPT

## 2023-01-06 PROCEDURE — 36415 COLL VENOUS BLD VENIPUNCTURE: CPT

## 2023-01-06 PROCEDURE — 85025 COMPLETE CBC W/AUTO DIFF WBC: CPT

## 2023-01-06 PROCEDURE — 2580000003 HC RX 258: Performed by: EMERGENCY MEDICINE

## 2023-01-06 RX ORDER — 0.9 % SODIUM CHLORIDE 0.9 %
1000 INTRAVENOUS SOLUTION INTRAVENOUS ONCE
Status: COMPLETED | OUTPATIENT
Start: 2023-01-06 | End: 2023-01-06

## 2023-01-06 RX ORDER — SODIUM CHLORIDE 9 MG/ML
INJECTION, SOLUTION INTRAVENOUS CONTINUOUS
Status: DISCONTINUED | OUTPATIENT
Start: 2023-01-06 | End: 2023-01-06 | Stop reason: HOSPADM

## 2023-01-06 RX ORDER — MAGNESIUM SULFATE HEPTAHYDRATE 40 MG/ML
2000 INJECTION, SOLUTION INTRAVENOUS PRN
Status: DISCONTINUED | OUTPATIENT
Start: 2023-01-06 | End: 2023-01-06 | Stop reason: HOSPADM

## 2023-01-06 RX ORDER — POTASSIUM CHLORIDE 7.45 MG/ML
10 INJECTION INTRAVENOUS PRN
Status: DISCONTINUED | OUTPATIENT
Start: 2023-01-06 | End: 2023-01-06 | Stop reason: HOSPADM

## 2023-01-06 RX ORDER — KETOROLAC TROMETHAMINE 30 MG/ML
15 INJECTION, SOLUTION INTRAMUSCULAR; INTRAVENOUS ONCE
Status: COMPLETED | OUTPATIENT
Start: 2023-01-06 | End: 2023-01-06

## 2023-01-06 RX ORDER — ACETAMINOPHEN 650 MG/1
650 SUPPOSITORY RECTAL EVERY 6 HOURS PRN
Status: DISCONTINUED | OUTPATIENT
Start: 2023-01-06 | End: 2023-01-06 | Stop reason: HOSPADM

## 2023-01-06 RX ORDER — METOCLOPRAMIDE HYDROCHLORIDE 5 MG/ML
10 INJECTION INTRAMUSCULAR; INTRAVENOUS ONCE
Status: COMPLETED | OUTPATIENT
Start: 2023-01-06 | End: 2023-01-06

## 2023-01-06 RX ORDER — ONDANSETRON 2 MG/ML
8 INJECTION INTRAMUSCULAR; INTRAVENOUS ONCE
Status: COMPLETED | OUTPATIENT
Start: 2023-01-06 | End: 2023-01-06

## 2023-01-06 RX ORDER — MORPHINE SULFATE 2 MG/ML
2 INJECTION, SOLUTION INTRAMUSCULAR; INTRAVENOUS
Status: DISCONTINUED | OUTPATIENT
Start: 2023-01-06 | End: 2023-01-06 | Stop reason: HOSPADM

## 2023-01-06 RX ORDER — SODIUM CHLORIDE 9 MG/ML
INJECTION, SOLUTION INTRAVENOUS PRN
Status: DISCONTINUED | OUTPATIENT
Start: 2023-01-06 | End: 2023-01-06 | Stop reason: HOSPADM

## 2023-01-06 RX ORDER — ONDANSETRON 4 MG/1
4 TABLET, ORALLY DISINTEGRATING ORAL EVERY 8 HOURS PRN
Status: DISCONTINUED | OUTPATIENT
Start: 2023-01-06 | End: 2023-01-06 | Stop reason: HOSPADM

## 2023-01-06 RX ORDER — ACETAMINOPHEN 325 MG/1
650 TABLET ORAL EVERY 6 HOURS PRN
Status: DISCONTINUED | OUTPATIENT
Start: 2023-01-06 | End: 2023-01-06 | Stop reason: HOSPADM

## 2023-01-06 RX ORDER — SODIUM CHLORIDE 0.9 % (FLUSH) 0.9 %
5-40 SYRINGE (ML) INJECTION EVERY 12 HOURS SCHEDULED
Status: DISCONTINUED | OUTPATIENT
Start: 2023-01-06 | End: 2023-01-06 | Stop reason: HOSPADM

## 2023-01-06 RX ORDER — MORPHINE SULFATE 4 MG/ML
4 INJECTION, SOLUTION INTRAMUSCULAR; INTRAVENOUS
Status: DISCONTINUED | OUTPATIENT
Start: 2023-01-06 | End: 2023-01-06 | Stop reason: HOSPADM

## 2023-01-06 RX ORDER — SODIUM CHLORIDE 0.9 % (FLUSH) 0.9 %
5-40 SYRINGE (ML) INJECTION PRN
Status: DISCONTINUED | OUTPATIENT
Start: 2023-01-06 | End: 2023-01-06 | Stop reason: HOSPADM

## 2023-01-06 RX ORDER — HYDROCODONE BITARTRATE AND ACETAMINOPHEN 5; 325 MG/1; MG/1
1 TABLET ORAL EVERY 8 HOURS PRN
COMMUNITY
Start: 2023-01-03 | End: 2023-01-06

## 2023-01-06 RX ORDER — POTASSIUM CHLORIDE 20 MEQ/1
40 TABLET, EXTENDED RELEASE ORAL PRN
Status: DISCONTINUED | OUTPATIENT
Start: 2023-01-06 | End: 2023-01-06 | Stop reason: HOSPADM

## 2023-01-06 RX ORDER — ONDANSETRON 2 MG/ML
4 INJECTION INTRAMUSCULAR; INTRAVENOUS EVERY 6 HOURS PRN
Status: DISCONTINUED | OUTPATIENT
Start: 2023-01-06 | End: 2023-01-06 | Stop reason: HOSPADM

## 2023-01-06 RX ORDER — TAMSULOSIN HYDROCHLORIDE 0.4 MG/1
0.4 CAPSULE ORAL DAILY
Status: DISCONTINUED | OUTPATIENT
Start: 2023-01-07 | End: 2023-01-06 | Stop reason: HOSPADM

## 2023-01-06 RX ORDER — POLYETHYLENE GLYCOL 3350 17 G/17G
17 POWDER, FOR SOLUTION ORAL DAILY PRN
Status: DISCONTINUED | OUTPATIENT
Start: 2023-01-06 | End: 2023-01-06 | Stop reason: HOSPADM

## 2023-01-06 RX ORDER — ENOXAPARIN SODIUM 100 MG/ML
40 INJECTION SUBCUTANEOUS DAILY
Status: DISCONTINUED | OUTPATIENT
Start: 2023-01-07 | End: 2023-01-06 | Stop reason: HOSPADM

## 2023-01-06 RX ADMIN — ONDANSETRON 8 MG: 2 INJECTION INTRAMUSCULAR; INTRAVENOUS at 09:33

## 2023-01-06 RX ADMIN — KETOROLAC TROMETHAMINE 15 MG: 30 INJECTION, SOLUTION INTRAMUSCULAR; INTRAVENOUS at 09:32

## 2023-01-06 RX ADMIN — SODIUM CHLORIDE 1000 ML: 9 INJECTION, SOLUTION INTRAVENOUS at 09:32

## 2023-01-06 RX ADMIN — HYDROMORPHONE HYDROCHLORIDE 1 MG: 1 INJECTION, SOLUTION INTRAMUSCULAR; INTRAVENOUS; SUBCUTANEOUS at 11:04

## 2023-01-06 RX ADMIN — METOCLOPRAMIDE 10 MG: 5 INJECTION, SOLUTION INTRAMUSCULAR; INTRAVENOUS at 11:05

## 2023-01-06 RX ADMIN — SODIUM CHLORIDE: 9 INJECTION, SOLUTION INTRAVENOUS at 14:33

## 2023-01-06 ASSESSMENT — PAIN - FUNCTIONAL ASSESSMENT: PAIN_FUNCTIONAL_ASSESSMENT: 0-10

## 2023-01-06 ASSESSMENT — PAIN DESCRIPTION - LOCATION
LOCATION: FLANK

## 2023-01-06 ASSESSMENT — PAIN DESCRIPTION - ORIENTATION
ORIENTATION: RIGHT

## 2023-01-06 ASSESSMENT — PAIN DESCRIPTION - PAIN TYPE: TYPE: ACUTE PAIN

## 2023-01-06 ASSESSMENT — PAIN SCALES - GENERAL
PAINLEVEL_OUTOF10: 10
PAINLEVEL_OUTOF10: 4
PAINLEVEL_OUTOF10: 10
PAINLEVEL_OUTOF10: 10
PAINLEVEL_OUTOF10: 0

## 2023-01-06 ASSESSMENT — ENCOUNTER SYMPTOMS: ABDOMINAL PAIN: 1

## 2023-01-06 ASSESSMENT — LIFESTYLE VARIABLES
HOW OFTEN DO YOU HAVE A DRINK CONTAINING ALCOHOL: NEVER
HOW MANY STANDARD DRINKS CONTAINING ALCOHOL DO YOU HAVE ON A TYPICAL DAY: PATIENT DOES NOT DRINK

## 2023-01-06 ASSESSMENT — PAIN DESCRIPTION - FREQUENCY: FREQUENCY: CONTINUOUS

## 2023-01-06 NOTE — PROGRESS NOTES
Dr. Burgess Shoemaker notified on pain control, and that patient just passed stone via perfect serve. Awaiting new orders.

## 2023-01-06 NOTE — PROGRESS NOTES
Discharge instructions provided to patient, all questions answered at this time. Peripheral IV removed without complication.

## 2023-01-06 NOTE — PROGRESS NOTES
Medication History completed:    New medications:   Hydrocodone-acetaminophen 5-325 mg    Medications discontinued:   Aspirin 325 mg - short term supply from January 2022    Medications flagged for review:  Propranolol ER 60 mg - patient no longer taking  Sertraline 50 mg - patient no longer taking  Sumatriptan 100 mg - patient no longer taking    Changes to dosing: none    Stated allergies: NDKA    Other pertinent information: Medications confirmed with [facility]. OARRS was reviewed. Patient last filled Hydrocodone-acetaminophen 5-325mg on 01/03/2022 for a 3 day supply.    Thank you,   Kathleen Draper, PharmD Candidate 2023

## 2023-01-06 NOTE — PROGRESS NOTES
Dr. Krys Samuels notified patient cleared for discharge per urology, see new orders. Dr. Moe Lin would like patient to follow up in the office in 6 weeks for a renal ultrasound.

## 2023-01-06 NOTE — ED TRIAGE NOTES
Mode of arrival (squad #, walk in, police, etc) : Walk In        Chief complaint(s): Flank pain, nausea        Arrival Note (brief scenario, treatment PTA, etc). : Pt arrives to ED c/o right sided flank pain with nausea. Patient states that the pain started on MOnday. Patient was seen in th ED on Monday and diagnosed with a 3mm kidney stone. Patient states that she was doing better for two days and then the pain and nausea came back.

## 2023-01-06 NOTE — PROGRESS NOTES
Orders received to send stone for analysis per Dr. Constance Krabbe, patient able to have diet tonight, and can discharge per urology considering pain is gone.

## 2023-01-06 NOTE — PROGRESS NOTES
Patient admitted to room 2077, vital signs stable, no signs of distress noted at this time. Head to toe assessment complete, see documentation for details. Bed in lowest position, brake is locked, and call light is within reach. Patient instructed to void in hat, and how to strain and save urine.

## 2023-01-06 NOTE — ED NOTES
Report given to Maria Guadalupe Carvalho from [facility]. Report method by phone   The following was reviewed with receiving RN:   Current vital signs:  /64   Pulse 61   Temp 98.3 °F (36.8 °C) (Oral)   Resp 16   Ht 5' 3\" (1.6 m)   Wt 160 lb (72.6 kg)   LMP 12/29/2022   SpO2 100%   BMI 28.34 kg/m²                MEWS Score: 1     Any medication or safety alerts were reviewed. Any pending diagnostics and notifications were also reviewed, as well as any safety concerns or issues, abnormal labs, abnormal imaging, and abnormal assessment findings. Questions were answered.           Mohammed Scheuermann, RN  01/06/23 8974

## 2023-01-06 NOTE — DISCHARGE INSTRUCTIONS
Your information:  Name: Rodrick Santos  : 2000    Your instructions:    ***    What to do after you leave the hospital:    Recommended diet: regular diet    Recommended activity: activity as tolerated        The following personal items were collected during your admission and were returned to you:    Belongings  Dental Appliances: None  Vision - Corrective Lenses: None  Hearing Aid: None  Clothing: Shirt, Pants, Socks, Undergarments, Footwear  Jewelry: Other (Comment) (belly button ring)  Body Piercings Removed: No  Electronic Devices: Cell Phone,   Weapons (Notify Protective Services/Security): None  Other Valuables: Leah Lott, Other (Comment) (bag)  Home Medications: None  Valuables Given To: Patient, Wilda Root    Information obtained by:  By signing below, I understand that if any problems occur once I leave the hospital I am to contact ***. I understand and acknowledge receipt of the instructions indicated above.

## 2023-01-06 NOTE — ED PROVIDER NOTES
EMERGENCY DEPARTMENT ENCOUNTER    Pt Name: Hemal Merida  MRN: [id number]  [place] 2000  Date of evaluation: 23  CHIEF COMPLAINT       Chief Complaint   Patient presents with    Flank Pain     HISTORY OF PRESENT ILLNESS     Abdominal Pain  Pain location:  R flank  Pain quality: aching    Pain radiates to:  RLQ  Pain severity:  Severe  Onset quality:  Gradual  Duration:  5 days  Timing:  Constant  Progression:  Unchanged  Chronicity:  New  Context comment:  Diagnosed with 3 mm kidney stone monday  Relieved by:  Nothing  Worsened by:  Nothing  Ineffective treatments: motrin, norco, flomax not working. Lmp now      REVIEW OF SYSTEMS     Review of Systems   Gastrointestinal:  Positive for abdominal pain. All other systems reviewed and are negative. PASTMEDICAL HISTORY     Past Medical History:   Diagnosis Date    Allergic rhinitis     Ankle fracture 2012     Contact dermatitis 2012    Obesity 2012     Past Problem List  Patient Active Problem List   Diagnosis Code    Allergic rhinitis J30.9    Dysmenorrhea N94.6     SURGICAL HISTORY     History reviewed. No pertinent surgical history. CURRENT MEDICATIONS       Previous Medications    HYDROCODONE-ACETAMINOPHEN (NORCO) 5-325 MG PER TABLET    Take 1 tablet by mouth every 8 hours as needed for Pain. IBUPROFEN (IBU) 800 MG TABLET    Take 1 tablet by mouth every 6 hours as needed for Pain    PROPRANOLOL (INDERAL LA) 60 MG EXTENDED RELEASE CAPSULE    Take 1 capsule by mouth daily    SERTRALINE (ZOLOFT) 50 MG TABLET    Take 1 tablet by mouth daily    SUMATRIPTAN (IMITREX) 100 MG TABLET    Take 1 tablet by mouth once as needed for Migraine    TAMSULOSIN (FLOMAX) 0.4 MG CAPSULE    Take 1 capsule by mouth daily     ALLERGIES     has No Known Allergies. FAMILY HISTORY     She indicated that her mother is alive. She indicated that her father is alive. She indicated that her sister is alive. She indicated that her brother is .  She indicated that her maternal grandmother is . She indicated that her maternal grandfather is alive. She indicated that her paternal grandfather is . She indicated that her maternal aunt is alive. She indicated that the status of her other is unknown. SOCIAL HISTORY       Social History     Tobacco Use    Smoking status: Passive Smoke Exposure - Never Smoker    Smokeless tobacco: Never    Tobacco comments:     mom smokes outside   Substance Use Topics    Alcohol use: No    Drug use: Yes     Types: Marijuana (Weed)     PHYSICAL EXAM     INITIAL VITALS: /66   Pulse 62   Temp 98.3 °F (36.8 °C) (Oral)   Resp 18   Ht 5' 3\" (1.6 m)   Wt 160 lb (72.6 kg)   LMP 2022   SpO2 94%   BMI 28.34 kg/m²    Physical Exam  Constitutional:       General: She is not in acute distress. Appearance: Normal appearance. She is well-developed. She is not diaphoretic. HENT:      Head: Normocephalic and atraumatic. Right Ear: External ear normal.      Left Ear: External ear normal.      Nose: Nose normal. No congestion. Mouth/Throat:      Mouth: Mucous membranes are moist.      Pharynx: Oropharynx is clear. Eyes:      General:         Right eye: No discharge. Left eye: No discharge. Conjunctiva/sclera: Conjunctivae normal.      Pupils: Pupils are equal, round, and reactive to light. Neck:      Trachea: No tracheal deviation. Cardiovascular:      Rate and Rhythm: Normal rate and regular rhythm. Pulses: Normal pulses. Heart sounds: Normal heart sounds. Pulmonary:      Effort: Pulmonary effort is normal. No respiratory distress. Breath sounds: Normal breath sounds. No stridor. No wheezing or rales. Abdominal:      Palpations: Abdomen is soft. Tenderness: There is no abdominal tenderness. There is no guarding or rebound. Musculoskeletal:         General: No tenderness or deformity. Normal range of motion.       Cervical back: Normal range of motion and neck supple. Comments: Right cva tenderness   Skin:     General: Skin is warm and dry. Capillary Refill: Capillary refill takes less than 2 seconds. Findings: No erythema or rash. Neurological:      General: No focal deficit present. Mental Status: She is alert and oriented to person, place, and time. Coordination: Coordination normal.   Psychiatric:         Mood and Affect: Mood normal.         Behavior: Behavior normal.         Thought Content: Thought content normal.         Judgment: Judgment normal.       MEDICAL DECISION MAKING / ED COURSE:   Summary of Patient Presentation:        1)  Number and Complexity of Problems Addressed at this Encounter  Problem List This Visit: right flank pain, vomiting    Differential Diagnosis: kidney stone    Diagnoses Considered but Do Not Suspect:  septic stone     Pertinent Comorbid Conditions:  known kidney stone from Monday with hydronephrosisi    2)  Data Reviewed and Analyzed  (Lab and radiology tests/orders below in next section)    External Documents Reviewed:  reviewed recent ct showing 3 mm kidney stone right ureter with hydronephrosis    3)  Treatment and Disposition    ED Course as of 01/06/23 1216   Fri Jan 06, 2023   1115 Dw dr [facility] urology, keep npo, will see on consult [WM]      ED Course User Index  [WM] Darnell Del Rosario MD       Patient repeat assessment: unchanged, admitting    Disposition discussion with patient/family:  admit    Case discussed with consulting clinician: Dr Oscar Antoine and Dr Bradley Rankin for admit      DATA FOR LAB AND RADIOLOGY TESTS ORDERED BELOW ARE REVIEWED BY THE ED CLINICIAN:    RADIOLOGY: All x-rays, CT, MRI, and formal ultrasound images (except ED bedside ultrasound) are read by the radiologist, see reports below, unless otherwise noted in MDM or here. Reports below are reviewed by myself.   XR ABDOMEN (KUB) (SINGLE AP VIEW)   Final Result      Proximal to mid small right renal calculus seen on the CT scan is not definitely identified. There are bilateral pelvic calcifications which   appear to be vascular though the possibility that 1 of the small   calcifications in the right pelvis could represent the previously noted right   renal calculus, now in the distal right ureter cannot be excluded if   suspected clinically. LABS: Lab orders shown below, the results are reviewed by myself, and all abnormals are listed below. Labs Reviewed   CBC WITH AUTO DIFFERENTIAL - Abnormal; Notable for the following components:       Result Value    RBC 3.81 (*)     Seg Neutrophils 73 (*)     Lymphocytes 17 (*)     Monocytes 8 (*)     All other components within normal limits   URINALYSIS WITH REFLEX TO CULTURE - Abnormal; Notable for the following components:    Urine Hgb TRACE (*)     pH, UA 8.5 (*)     All other components within normal limits   CULTURE, URINE   COMPREHENSIVE METABOLIC PANEL   MICROSCOPIC URINALYSIS       Vitals Reviewed:    Vitals:    01/06/23 0900 01/06/23 1108 01/06/23 1129   BP: (!) 123/91 115/74 105/66   Pulse: 63 88 62   Resp: 17 25 18   Temp: 98.3 °F (36.8 °C)     TempSrc: Oral     SpO2: 100% 100% 94%   Weight: 160 lb (72.6 kg)     Height: 5' 3\" (1.6 m)       MEDICATIONS GIVEN TO PATIENT THIS ENCOUNTER:  Orders Placed This Encounter   Medications    0.9 % sodium chloride bolus    ketorolac (TORADOL) injection 15 mg    ondansetron (ZOFRAN) injection 8 mg    HYDROmorphone (DILAUDID) injection 1 mg    metoclopramide (REGLAN) injection 10 mg     DISCHARGE PRESCRIPTIONS:  New Prescriptions    No medications on file     PHYSICIAN CONSULTS ORDERED THIS ENCOUNTER:  IP CONSULT TO FAMILY MEDICINE  IP CONSULT TO UROLOGY  FINAL IMPRESSION      1. Kidney stone    2. Intractable pain    3. Intractable vomiting          DISPOSITION/PLAN   DISPOSITION Decision To Admit 01/06/2023 10:34:23 AM      OUTPATIENT FOLLOW UP THE PATIENT:  No follow-up provider specified.     MD Deepti Tirado, MD  01/06/23 3099

## 2023-01-07 LAB
CULTURE: NORMAL
SPECIMEN DESCRIPTION: NORMAL

## 2023-01-07 NOTE — H&P
Family Medicine Admit Note    PCP: ANI Del Valle CNP    Date of Admission: 1/6/2023    Date of Service: Pt seen/examined on 1/6/23 and Admitted to Inpatient. Chief Complaint:  Flank Pain      History Of Present Illness: The patient is a 20 y.o. female who presents to [facility] with complaints of right flank pain that radiates to her RLQ. The pain is severe and cramping in nature. It has been present for the past 5 days and nothing seems to make the pain better or worse. She was diagnosed with kidney stone on Monday. She has been on Motrin, Norco and Flomax without much relief. She denies any fevers, chills, cough, congestion, chest pain, SOB, headaches or confusion. Past Medical History:        Diagnosis Date    Allergic rhinitis     Ankle fracture 11/2/2012 08/2005     Contact dermatitis 11/2/2012    Obesity 11/2/2012       Past Surgical History:    History reviewed. No pertinent surgical history. Medications Prior to Admission:    Prior to Admission medications    Medication Sig Start Date End Date Taking? Authorizing Provider   tamsulosin (FLOMAX) 0.4 MG capsule Take 1 capsule by mouth daily 1/2/23   [place] Gonsales MD   ibuprofen (IBU) 800 MG tablet Take 1 tablet by mouth every 6 hours as needed for Pain 1/2/23 1/12/23  [place] Gonsales MD   SUMAtriptan (IMITREX) 100 MG tablet Take 1 tablet by mouth once as needed for Migraine  Patient not taking: Reported on 1/6/2023 2/9/21 6/24/21  ANI Del Valle CNP       Allergies:  Patient has no known allergies. Social History:  The patient currently lives at home    TOBACCO:   reports that she is a non-smoker but has been exposed to tobacco smoke. She has never used smokeless tobacco.  ETOH:   reports no history of alcohol use.     Review of Systems - General ROS: positive for  - overweight  Genito-Urinary ROS: positive for - pelvic pain      Family History:          Problem Relation Age of Onset    Arthritis Father     Mental Illness Father     Asthma Sister     High Blood Pressure Sister     Early Death Brother     Diabetes Maternal Grandmother     Hearing Loss Maternal Grandmother     Stroke Maternal Grandmother     Hearing Loss Maternal Grandfather     Other Mother         spinal deformity    Heart Attack Mother     High Blood Pressure Mother     Hearing Loss Other        PHYSICAL EXAM:    BP (!) 140/87   Pulse 59   Temp 97.7 °F (36.5 °C) (Oral)   Resp 17   Ht 5' 3\" (1.6 m)   Wt 160 lb (72.6 kg)   LMP 12/29/2022 Comment: just ending  SpO2 100%   BMI 28.34 kg/m²     General appearance: No apparent distress appears stated age and cooperative. HEENT Normal cephalic, atraumatic without obvious deformity. Pupils equal, round, and reactive to light. Extra ocular muscles intact. Conjunctivae/corneas clear. Neck: Supple, No jugular venous distention/bruits. Trachea midline without thyromegaly or adenopathy with full range of motion. Lungs: Clear to auscultation, bilaterally without Rales/Wheezes/Rhonchi with good respiratory effort. Heart: Regular rate and rhythm with Normal S1/S2 without murmurs, rubs or gallops, point of maximum impulse non-displaced  Abdomen: Soft, non-tender or non-distended without rigidity or guarding and positive bowel sounds all four quadrants. Extremities: No clubbing, cyanosis, or edema bilaterally. Full range of motion without deformity and normal gait intact. Skin: Skin color, texture, turgor normal.  No rashes or lesions. Neurologic: Alert and oriented X 3, neurovascularly intact with sensory/motor intact upper extremities/lower extremities, bilaterally. Cranial nerves: II-XII intact, grossly non-focal.  Mental status: Alert, oriented, thought content appropriate.     CXR:  I have reviewed the CXR with the following interpretation: not done  EKG:  I have reviewed the EKG with the following interpretation: not done    CBC   Recent Labs     01/06/23  0928   WBC 7.8   HGB 12.3   HCT 36.6    RENAL  Recent Labs     01/06/23 0928      K 4.0      CO2 24   BUN 7   CREATININE 0.77     LFT'S  Recent Labs     01/06/23 0928   AST 15   ALT 8   BILITOT 0.4   ALKPHOS 58     COAG  No results for input(s): INR in the last 72 hours. CARDIAC ENZYMES  No results for input(s): CKTOTAL, CKMB, CKMBINDEX, TROPONINI in the last 72 hours. U/A:    Lab Results   Component Value Date/Time    NITRITE neg 05/10/2022 01:50 PM    COLORU Yellow 01/06/2023 09:28 AM    WBCUA 0 TO 2 01/06/2023 09:28 AM    RBCUA 3 to 5 01/06/2023 09:28 AM    MUCUS NOT REPORTED 12/13/2020 08:15 AM    BACTERIA None 01/06/2023 09:28 AM    CLARITYU clear 05/10/2022 01:50 PM    SPECGRAV 1.006 01/06/2023 09:28 AM    LEUKOCYTESUR NEGATIVE 01/06/2023 09:28 AM    BLOODU Trace 05/10/2022 01:50 PM    GLUCOSEU NEGATIVE 01/06/2023 09:28 AM    AMORPHOUS NOT REPORTED 12/13/2020 08:15 AM       ABG  No results found for: OEP2MWU, BEART, G0YYPTHH, PHART, THGBART, AQD5UPV, PO2ART, ROG3DHY        Active Hospital Problems    Diagnosis Date Noted    Nephrolithiasis [N20.0] 01/06/2023     Priority: Medium         ASSESSMENT/PLAN:  Patient admitted with Nephrolithiasis. -Urology consulted for lithotripsy.  -Patient actually passed the stone on her own and her pain significantly decreased.  -Okay to discharge home.  -Follow up in the office with Dr. Audrey Sotelo next week. -Complete orders per chart.     DVT Prophylaxis:   Diet: No diet orders on file  Code Status: Prior      Dispo - admitted to Med/Surg       @[facility]@

## 2023-01-07 NOTE — DISCHARGE SUMMARY
[facility] Discharge Summary      Patient ID: Natalia Parker    MRN: [id number]     Acct:  [de-identified]       Patient's PCP: ANI Vasquez CNP    Admit Date: 1/6/2023     Discharge Date: 1/6/2023      Admitting Physician: Rafal Sal MD    Discharge Physician: Dileep Jones MD     Discharge Diagnoses:    Primary Problem  Nephrolithiasis    Active Hospital Problems    Diagnosis Date Noted    Nephrolithiasis [N20.0] 01/06/2023     Priority: Medium     Past Medical History:   Diagnosis Date    Allergic rhinitis     Ankle fracture 11/2/2012 08/2005     Contact dermatitis 11/2/2012    Obesity 11/2/2012     The patient was seen and examined on day of discharge and this discharge summary is in conjunction with any daily progress note from day of discharge. Code Status:  Prior    Hospital Course: uncomplicated    Consults:  urology    Significant Diagnostic Studies: as above, and as follows: see chart    Treatments: as above    Disposition: home    Discharged Condition: Stable    Follow Up:  ANI Vasquez CNP in one week    Discharge Medications:      Medication List        CONTINUE taking these medications      ibuprofen 800 MG tablet  Commonly known as: IBU  Take 1 tablet by mouth every 6 hours as needed for Pain     SUMAtriptan 100 MG tablet  Commonly known as: IMITREX  Take 1 tablet by mouth once as needed for Migraine     tamsulosin 0.4 MG capsule  Commonly known as: FLOMAX  Take 1 capsule by mouth daily            STOP taking these medications      propranolol 60 MG extended release capsule  Commonly known as: INDERAL LA     sertraline 50 MG tablet  Commonly known as: ZOLOFT            ASK your doctor about these medications      HYDROcodone-acetaminophen 5-325 MG per tablet  Commonly known as: Mary3 Alexandru Urbina  Ask about: Should I take this medication?                Activity: activity as tolerated    Diet: regular diet    Time Spent on discharge is more than 30 minutes in the examination, evaluation, counseling and review of medications and discharge plan.       Electronically signed by Jose Armando Garnica MD on 1/7/2023 at 3:00 PM

## 2023-01-10 LAB
STONE COMPOSITION: NORMAL
STONE DESCRIPTION: NORMAL
STONE MASS: 10 MG

## 2023-04-07 NOTE — LETTER
[address] [id number]  Phone: [phone]  Fax: [phone]    Christina Ladd MD        November 22, 2021     Patient: Starlett Hodgkins   YOB: 2000   Date of Visit: 11/22/2021       To Whom It May Concern:     Starlett Hodgkins will return to work with the following restrictions: seated work for 6 weeks. Returning to full duty approximately 1/3/2022. If you have any questions or concerns, please don't hesitate to call.     Sincerely,    The office of Dr. Trevor Jason MD Uptitrating Miralax to bowel movement

## 2023-04-27 ENCOUNTER — HOSPITAL ENCOUNTER (EMERGENCY)
Age: 23
Discharge: HOME | End: 2023-04-27
Attending: EMERGENCY MEDICINE
Payer: COMMERCIAL

## 2023-04-27 VITALS
WEIGHT: 160 LBS | SYSTOLIC BLOOD PRESSURE: 126 MMHG | OXYGEN SATURATION: 99 % | HEIGHT: 63 IN | DIASTOLIC BLOOD PRESSURE: 82 MMHG | BODY MASS INDEX: 28.35 KG/M2 | RESPIRATION RATE: 18 BRPM | HEART RATE: 80 BPM | TEMPERATURE: 98.2 F

## 2023-04-27 DIAGNOSIS — B37.31 CANDIDAL VULVOVAGINITIS: Primary | ICD-10-CM

## 2023-04-27 LAB
BILIRUBIN URINE: NEGATIVE
CANDIDA SPECIES, DNA PROBE: POSITIVE
COLOR: YELLOW
COMMENT UA: NORMAL
GARDNERELLA VAGINALIS, DNA PROBE: NEGATIVE
GLUCOSE UR STRIP.AUTO-MCNC: NEGATIVE MG/DL
HCG(URINE) PREGNANCY TEST: NEGATIVE
KETONES UR STRIP.AUTO-MCNC: NEGATIVE MG/DL
LEUKOCYTE ESTERASE UR QL STRIP.AUTO: NEGATIVE
NITRITE UR QL STRIP.AUTO: NEGATIVE
PROT UR STRIP.AUTO-MCNC: 6.5 MG/DL (ref 5–8)
PROT UR STRIP.AUTO-MCNC: NEGATIVE MG/DL
SOURCE: ABNORMAL
SPECIFIC GRAVITY UA: 1.01 (ref 1–1.03)
TRICHOMONAS VAGINALIS DNA: NEGATIVE
TURBIDITY: CLEAR
URINE HGB: NEGATIVE
UROBILINOGEN, URINE: NORMAL

## 2023-04-27 PROCEDURE — 86694 HERPES SIMPLEX NES ANTBDY: CPT

## 2023-04-27 PROCEDURE — 87491 CHLMYD TRACH DNA AMP PROBE: CPT

## 2023-04-27 PROCEDURE — 81003 URINALYSIS AUTO W/O SCOPE: CPT

## 2023-04-27 PROCEDURE — 6370000000 HC RX 637 (ALT 250 FOR IP): Performed by: PHYSICIAN ASSISTANT

## 2023-04-27 PROCEDURE — 87510 GARDNER VAG DNA DIR PROBE: CPT

## 2023-04-27 PROCEDURE — 81025 URINE PREGNANCY TEST: CPT

## 2023-04-27 PROCEDURE — 36415 COLL VENOUS BLD VENIPUNCTURE: CPT

## 2023-04-27 PROCEDURE — 86696 HERPES SIMPLEX TYPE 2 TEST: CPT

## 2023-04-27 PROCEDURE — 86695 HERPES SIMPLEX TYPE 1 TEST: CPT

## 2023-04-27 PROCEDURE — 87660 TRICHOMONAS VAGIN DIR PROBE: CPT

## 2023-04-27 PROCEDURE — 87591 N.GONORRHOEAE DNA AMP PROB: CPT

## 2023-04-27 PROCEDURE — 99283 EMERGENCY DEPT VISIT LOW MDM: CPT

## 2023-04-27 PROCEDURE — 87480 CANDIDA DNA DIR PROBE: CPT

## 2023-04-27 RX ORDER — FLUCONAZOLE 150 MG/1
150 TABLET ORAL ONCE
Status: COMPLETED | OUTPATIENT
Start: 2023-04-27 | End: 2023-04-27

## 2023-04-27 RX ADMIN — FLUCONAZOLE 150 MG: 150 TABLET ORAL at 19:02

## 2023-04-27 ASSESSMENT — PAIN - FUNCTIONAL ASSESSMENT: PAIN_FUNCTIONAL_ASSESSMENT: NONE - DENIES PAIN

## 2023-04-27 NOTE — ED PROVIDER NOTES
eMERGENCY dEPARTMENT eNCOUnter   Independent Attestation     Pt Name: Laura Frank  MRN: [id number]  [place] 2000  Date of evaluation: 4/27/23     Laura Frank is a 20 y.o. female with CC: Sexually Transmitted Diseases      Based on the medical record the care appears appropriate. I was personally available for consultation in the Emergency Department.     Sunil Call MD  Attending Emergency Physician                  Sunil Call MD  04/27/23 [id number]

## 2023-04-27 NOTE — ED NOTES
Mode of arrival (squad #, walk in, police, etc) : Walk in         Chief complaint(s): STI check         Arrival Note (brief scenario, treatment PTA, etc). : Patient states she was told by a partner of STI exposure, she is asymptomatic at this time         C= \"Have you ever felt that you should Cut down on your drinking? \"  No  A= \"Have people Annoyed you by criticizing your drinking? \"  No  G= \"Have you ever felt bad or Guilty about your drinking? \"  No  E= \"Have you ever had a drink as an Eye-opener first thing in the morning to steady your nerves or to help a hangover? \"  No      Deferred []      Reason for deferring: N/A    *If yes to two or more: probable alcohol abuse. Guido Daly RN  04/27/23 [id number]

## 2023-04-27 NOTE — ED PROVIDER NOTES
[address] ED  eMERGENCY dEPARTMENT eNCOUnter      Pt Name: Andre Diaz  MRN: [id number]  [place] 2000  Date of evaluation: 4/27/2023  Provider: Atiya Jules PA-C    CHIEF COMPLAINT       Chief Complaint   Patient presents with    Sexually Transmitted Diseases           HISTORY OF PRESENT ILLNESS  (Location/Symptom, Timing/Onset, Context/Setting, Quality, Duration, Modifying Factors, Severity.)   Andre Diaz is a 20 y.o. female who presents to the emergency department for std check. Pt states her ex boyfriend called her and told her he has herpes. Pt has not been sexually active with him since November. She states she does have some vaginal spotting but is at the end of her menstrual cycle. She denies any discharge, odor, rash, lesions, abd pain, nausea, emesis, dysuria, urgency, frequency, fevers, chills. No other complaints. Nursing Notes were reviewed. REVIEW OF SYSTEMS    (2-9 systems for level 4, 10 or more for level 5)     Review of Systems   STD exposure    Except as noted above the remainder of the review of systems was reviewed and negative. PAST MEDICAL HISTORY     Past Medical History:   Diagnosis Date    Allergic rhinitis     Ankle fracture 11/2/2012 08/2005     Contact dermatitis 11/2/2012    Obesity 11/2/2012     None otherwise stated in nurses notes    SURGICAL HISTORY     No past surgical history on file.   None otherwise stated in nurses notes    CURRENT MEDICATIONS       Discharge Medication List as of 4/27/2023  6:59 PM        CONTINUE these medications which have NOT CHANGED    Details   tamsulosin (FLOMAX) 0.4 MG capsule Take 1 capsule by mouth daily, Disp-30 capsule, R-0Normal      ibuprofen (IBU) 800 MG tablet Take 1 tablet by mouth every 6 hours as needed for Pain, Disp-21 tablet, R-0Normal      SUMAtriptan (IMITREX) 100 MG tablet Take 1 tablet by mouth once as needed for Migraine, Disp-9 tablet, R-11Normal             ALLERGIES     Patient has no

## 2023-04-28 LAB
C TRACH DNA SPEC QL PROBE+SIG AMP: NEGATIVE
N GONORRHOEA DNA SPEC QL PROBE+SIG AMP: NEGATIVE
SPECIMEN DESCRIPTION: NORMAL

## 2023-05-02 LAB
HERPES SIMPLEX VIRUS 2 IGG: 0.55
HERPES TYPE 1/2 IGM COMBINED: 0.3
HSV1 IGG SERPL QL IA: 6.57

## 2025-01-18 ENCOUNTER — HOSPITAL ENCOUNTER (EMERGENCY)
Age: 25
Discharge: HOME | End: 2025-01-18
Attending: EMERGENCY MEDICINE

## 2025-01-18 ENCOUNTER — APPOINTMENT (OUTPATIENT)
Dept: CT IMAGING | Age: 25
End: 2025-01-18

## 2025-01-18 VITALS
OXYGEN SATURATION: 98 % | SYSTOLIC BLOOD PRESSURE: 113 MMHG | TEMPERATURE: 98.1 F | BODY MASS INDEX: 28.35 KG/M2 | HEIGHT: 63 IN | DIASTOLIC BLOOD PRESSURE: 78 MMHG | RESPIRATION RATE: 18 BRPM | HEART RATE: 71 BPM | WEIGHT: 160 LBS

## 2025-01-18 DIAGNOSIS — R10.30 LOWER ABDOMINAL PAIN: Primary | ICD-10-CM

## 2025-01-18 DIAGNOSIS — R19.7 NAUSEA VOMITING AND DIARRHEA: ICD-10-CM

## 2025-01-18 DIAGNOSIS — R11.2 NAUSEA VOMITING AND DIARRHEA: ICD-10-CM

## 2025-01-18 LAB
ALBUMIN SERPL-MCNC: 4.5 G/DL (ref 3.5–5.2)
ALP SERPL-CCNC: 54 U/L (ref 35–104)
ALT SERPL-CCNC: 9 U/L (ref 10–35)
ANION GAP SERPL CALCULATED.3IONS-SCNC: 13 MMOL/L (ref 9–16)
AST SERPL-CCNC: 18 U/L (ref 10–35)
BACTERIA URNS QL MICRO: ABNORMAL
BASOPHILS # BLD: 0.1 K/UL (ref 0–0.2)
BASOPHILS NFR BLD: 0 % (ref 0–2)
BILIRUB SERPL-MCNC: 0.3 MG/DL (ref 0–1.2)
BILIRUB UR QL STRIP: NEGATIVE
BUN SERPL-MCNC: 7 MG/DL (ref 6–20)
CALCIUM SERPL-MCNC: 8.8 MG/DL (ref 8.6–10.4)
CASTS #/AREA URNS LPF: ABNORMAL /LPF
CHLORIDE SERPL-SCNC: 106 MMOL/L (ref 98–107)
CLARITY UR: ABNORMAL
CO2 SERPL-SCNC: 22 MMOL/L (ref 20–31)
COLOR UR: YELLOW
CREAT SERPL-MCNC: 0.7 MG/DL (ref 0.7–1.2)
EOSINOPHIL # BLD: 0.1 K/UL (ref 0–0.4)
EOSINOPHILS RELATIVE PERCENT: 1 % (ref 0–4)
EPI CELLS #/AREA URNS HPF: ABNORMAL /HPF
ERYTHROCYTE [DISTWIDTH] IN BLOOD BY AUTOMATED COUNT: 13.6 % (ref 11.5–14.9)
GFR, ESTIMATED: >90 ML/MIN/1.73M2
GLUCOSE SERPL-MCNC: 123 MG/DL (ref 74–99)
GLUCOSE UR STRIP-MCNC: NEGATIVE MG/DL
HCG SERPL QL: NEGATIVE
HCT VFR BLD AUTO: 38.9 % (ref 36–46)
HGB BLD-MCNC: 13.1 G/DL (ref 12–16)
HGB UR QL STRIP.AUTO: ABNORMAL
KETONES UR STRIP-MCNC: NEGATIVE MG/DL
LEUKOCYTE ESTERASE UR QL STRIP: ABNORMAL
LIPASE SERPL-CCNC: 21 U/L (ref 13–60)
LYMPHOCYTES NFR BLD: 1.6 K/UL (ref 1–4.8)
LYMPHOCYTES RELATIVE PERCENT: 11 % (ref 24–44)
MCH RBC QN AUTO: 33.6 PG (ref 26–34)
MCHC RBC AUTO-ENTMCNC: 33.7 G/DL (ref 31–37)
MCV RBC AUTO: 99.8 FL (ref 80–100)
MONOCYTES NFR BLD: 1 K/UL (ref 0.1–1.3)
MONOCYTES NFR BLD: 7 % (ref 1–7)
NEUTROPHILS NFR BLD: 81 % (ref 36–66)
NEUTS SEG NFR BLD: 11.7 K/UL (ref 1.3–9.1)
NITRITE UR QL STRIP: NEGATIVE
PH UR STRIP: >=9 [PH] (ref 5–8)
PLATELET # BLD AUTO: 249 K/UL (ref 150–450)
PMV BLD AUTO: 8.6 FL (ref 6–12)
POTASSIUM SERPL-SCNC: 3.5 MMOL/L (ref 3.7–5.3)
PROT SERPL-MCNC: 6.8 G/DL (ref 6.6–8.7)
PROT UR STRIP-MCNC: ABNORMAL MG/DL
RBC # BLD AUTO: 3.9 M/UL (ref 4–5.2)
RBC #/AREA URNS HPF: ABNORMAL /HPF
SODIUM SERPL-SCNC: 141 MMOL/L (ref 136–145)
SP GR UR STRIP: 1.02 (ref 1–1.03)
UROBILINOGEN UR STRIP-ACNC: NORMAL EU/DL (ref 0–1)
WBC #/AREA URNS HPF: ABNORMAL /HPF
WBC OTHER # BLD: 14.4 K/UL (ref 3.5–11)

## 2025-01-18 PROCEDURE — 81001 URINALYSIS AUTO W/SCOPE: CPT

## 2025-01-18 PROCEDURE — 87086 URINE CULTURE/COLONY COUNT: CPT

## 2025-01-18 PROCEDURE — 96374 THER/PROPH/DIAG INJ IV PUSH: CPT

## 2025-01-18 PROCEDURE — 96375 TX/PRO/DX INJ NEW DRUG ADDON: CPT

## 2025-01-18 PROCEDURE — 6360000004 HC RX CONTRAST MEDICATION: Performed by: EMERGENCY MEDICINE

## 2025-01-18 PROCEDURE — 74177 CT ABD & PELVIS W/CONTRAST: CPT

## 2025-01-18 PROCEDURE — 2580000003 HC RX 258: Performed by: EMERGENCY MEDICINE

## 2025-01-18 PROCEDURE — 96372 THER/PROPH/DIAG INJ SC/IM: CPT

## 2025-01-18 PROCEDURE — 99285 EMERGENCY DEPT VISIT HI MDM: CPT

## 2025-01-18 PROCEDURE — 83690 ASSAY OF LIPASE: CPT

## 2025-01-18 PROCEDURE — 84703 CHORIONIC GONADOTROPIN ASSAY: CPT

## 2025-01-18 PROCEDURE — 85025 COMPLETE CBC W/AUTO DIFF WBC: CPT

## 2025-01-18 PROCEDURE — 6360000002 HC RX W HCPCS: Performed by: EMERGENCY MEDICINE

## 2025-01-18 PROCEDURE — 2500000003 HC RX 250 WO HCPCS: Performed by: EMERGENCY MEDICINE

## 2025-01-18 PROCEDURE — 36415 COLL VENOUS BLD VENIPUNCTURE: CPT

## 2025-01-18 PROCEDURE — 80053 COMPREHEN METABOLIC PANEL: CPT

## 2025-01-18 RX ORDER — 0.9 % SODIUM CHLORIDE 0.9 %
1000 INTRAVENOUS SOLUTION INTRAVENOUS ONCE
Status: COMPLETED | OUTPATIENT
Start: 2025-01-18 | End: 2025-01-18

## 2025-01-18 RX ORDER — SODIUM CHLORIDE 0.9 % (FLUSH) 0.9 %
10 SYRINGE (ML) INJECTION PRN
Status: DISCONTINUED | OUTPATIENT
Start: 2025-01-18 | End: 2025-01-18 | Stop reason: HOSPADM

## 2025-01-18 RX ORDER — DICYCLOMINE HCL 20 MG
20 TABLET ORAL 4 TIMES DAILY
Qty: 30 TABLET | Refills: 0 | Status: SHIPPED | OUTPATIENT
Start: 2025-01-18

## 2025-01-18 RX ORDER — IOPAMIDOL 755 MG/ML
75 INJECTION, SOLUTION INTRAVASCULAR
Status: COMPLETED | OUTPATIENT
Start: 2025-01-18 | End: 2025-01-18

## 2025-01-18 RX ORDER — ONDANSETRON 2 MG/ML
4 INJECTION INTRAMUSCULAR; INTRAVENOUS ONCE
Status: COMPLETED | OUTPATIENT
Start: 2025-01-18 | End: 2025-01-18

## 2025-01-18 RX ORDER — KETOROLAC TROMETHAMINE 30 MG/ML
30 INJECTION, SOLUTION INTRAMUSCULAR; INTRAVENOUS ONCE
Status: COMPLETED | OUTPATIENT
Start: 2025-01-18 | End: 2025-01-18

## 2025-01-18 RX ORDER — ONDANSETRON 4 MG/1
4 TABLET, ORALLY DISINTEGRATING ORAL 3 TIMES DAILY PRN
Qty: 21 TABLET | Refills: 0 | Status: SHIPPED | OUTPATIENT
Start: 2025-01-18

## 2025-01-18 RX ORDER — DICYCLOMINE HYDROCHLORIDE 10 MG/ML
20 INJECTION INTRAMUSCULAR ONCE
Status: COMPLETED | OUTPATIENT
Start: 2025-01-18 | End: 2025-01-18

## 2025-01-18 RX ORDER — 0.9 % SODIUM CHLORIDE 0.9 %
100 INTRAVENOUS SOLUTION INTRAVENOUS ONCE
Status: COMPLETED | OUTPATIENT
Start: 2025-01-18 | End: 2025-01-18

## 2025-01-18 RX ADMIN — ONDANSETRON 4 MG: 2 INJECTION, SOLUTION INTRAMUSCULAR; INTRAVENOUS at 15:47

## 2025-01-18 RX ADMIN — SODIUM CHLORIDE 100 ML: 9 INJECTION, SOLUTION INTRAVENOUS at 16:37

## 2025-01-18 RX ADMIN — IOPAMIDOL 75 ML: 755 INJECTION, SOLUTION INTRAVENOUS at 16:36

## 2025-01-18 RX ADMIN — SODIUM CHLORIDE 1000 ML: 9 INJECTION, SOLUTION INTRAVENOUS at 15:51

## 2025-01-18 RX ADMIN — SODIUM CHLORIDE, PRESERVATIVE FREE 10 ML: 5 INJECTION INTRAVENOUS at 16:36

## 2025-01-18 RX ADMIN — DICYCLOMINE HYDROCHLORIDE 20 MG: 10 INJECTION, SOLUTION INTRAMUSCULAR at 16:48

## 2025-01-18 RX ADMIN — KETOROLAC TROMETHAMINE 30 MG: 30 INJECTION, SOLUTION INTRAMUSCULAR at 15:44

## 2025-01-18 ASSESSMENT — ENCOUNTER SYMPTOMS
SHORTNESS OF BREATH: 0
EYE REDNESS: 0
EYE DISCHARGE: 0
COUGH: 0
DIARRHEA: 1
FACIAL SWELLING: 0
RHINORRHEA: 0
BLOOD IN STOOL: 0
NAUSEA: 1
CHEST TIGHTNESS: 0
WHEEZING: 0
EYE PAIN: 0
SINUS PRESSURE: 0
SORE THROAT: 0
ABDOMINAL PAIN: 1
COLOR CHANGE: 0
TROUBLE SWALLOWING: 0
BACK PAIN: 0
CONSTIPATION: 0
VOMITING: 1

## 2025-01-18 ASSESSMENT — PAIN DESCRIPTION - PAIN TYPE: TYPE: ACUTE PAIN

## 2025-01-18 ASSESSMENT — PAIN SCALES - GENERAL
PAINLEVEL_OUTOF10: 10
PAINLEVEL_OUTOF10: 8
PAINLEVEL_OUTOF10: 8

## 2025-01-18 ASSESSMENT — PAIN DESCRIPTION - ORIENTATION
ORIENTATION: RIGHT

## 2025-01-18 ASSESSMENT — PAIN DESCRIPTION - LOCATION
LOCATION: ABDOMEN

## 2025-01-18 ASSESSMENT — PAIN DESCRIPTION - DESCRIPTORS
DESCRIPTORS: SHARP;SHOOTING
DESCRIPTORS: SHOOTING;SHARP
DESCRIPTORS: STABBING;SHARP

## 2025-01-18 NOTE — ED PROVIDER NOTES
eMERGENCY dEPARTMENT eNCOUnter      Pt Name: Ksenia Yu  MRN: [id number]  Birthdate 2000  Date of evaluation: 1/18/25      CHIEF COMPLAINT       Chief Complaint   Patient presents with    Abdominal Pain    Vomiting     Sister recently dx with flu A and norovirus, pt with n/v abdominal pain         HISTORY OF PRESENT ILLNESS    Ksenia Yu is a 24 y.o. female who presents complaining of vomiting.  Patient states for the last couple days has been having severe nausea vomiting and diarrhea.  Patient states cannot keep anything down.  Patient states that today she started helping some pain especially the right lower quadrant but she says that she has had an ovarian cyst there before.  Patient is currently on her menses and cannot really say whether there is blood in the urine or in her diarrhea because of that.  Patient states she does not think she has had any fevers.  Patient's sister was just diagnosed with influenza A and norovirus.      REVIEW OF SYSTEMS       Review of Systems   Constitutional:  Positive for fatigue. Negative for activity change, appetite change, chills, diaphoresis and fever.   HENT:  Negative for congestion, ear pain, facial swelling, nosebleeds, rhinorrhea, sinus pressure, sore throat and trouble swallowing.    Eyes:  Negative for pain, discharge and redness.   Respiratory:  Negative for cough, chest tightness, shortness of breath and wheezing.    Cardiovascular:  Negative for chest pain, palpitations and leg swelling.   Gastrointestinal:  Positive for abdominal pain, diarrhea, nausea and vomiting. Negative for blood in stool and constipation.   Genitourinary:  Negative for difficulty urinating, dysuria, flank pain, frequency, genital sores and hematuria.   Musculoskeletal:  Positive for myalgias. Negative for arthralgias, back pain, gait problem, joint swelling and neck pain.   Skin:  Negative for color change, pallor, rash and wound.   Neurological:  Negative for dizziness, tremors,  were completed with a voice recognition program.  Efforts were made to edit thedictations but occasionally words are mis-transcribed.)    Obdulio Vieyra MD  Attending Emergency Physician                        Obdulio Vieyra MD  01/18/25 [id number]

## 2025-01-19 LAB
MICROORGANISM SPEC CULT: NO GROWTH
SPECIMEN DESCRIPTION: NORMAL